# Patient Record
Sex: FEMALE | Race: WHITE | NOT HISPANIC OR LATINO | Employment: STUDENT | URBAN - METROPOLITAN AREA
[De-identification: names, ages, dates, MRNs, and addresses within clinical notes are randomized per-mention and may not be internally consistent; named-entity substitution may affect disease eponyms.]

---

## 2023-08-31 LAB
COMPLETE PATHOLOGY REPORT: NORMAL
CONVERTED CLINICAL DIAGNOSIS-HISTORY: NORMAL
CONVERTED FINAL DIAGNOSIS: NORMAL
CONVERTED FINAL REPORT PDF LINK TO COPY AND PASTE: NORMAL
CONVERTED GROSS DESCRIPTION: NORMAL
CONVERTED INTRAOPERATIVE DIAGNOSIS: NORMAL

## 2023-09-26 PROBLEM — C79.31 PRIMARY MALIGNANT NEOPLASM OF LUNG WITH METASTASIS TO BRAIN (MULTI): Status: ACTIVE | Noted: 2023-09-26

## 2023-09-26 PROBLEM — Z13.71 BRCA NEGATIVE: Status: ACTIVE | Noted: 2023-09-26

## 2023-09-26 PROBLEM — G93.89 BRAIN MASS: Status: ACTIVE | Noted: 2023-09-26

## 2023-09-26 PROBLEM — C34.90 PRIMARY MALIGNANT NEOPLASM OF LUNG WITH METASTASIS TO BRAIN (MULTI): Status: ACTIVE | Noted: 2023-09-26

## 2023-09-26 PROBLEM — C48.2: Status: ACTIVE | Noted: 2023-09-26

## 2023-09-26 PROBLEM — R92.8 ABNORMAL FINDING ON BREAST IMAGING: Status: ACTIVE | Noted: 2023-09-26

## 2023-09-26 RX ORDER — TRAMADOL HYDROCHLORIDE 100 MG/1
TABLET, COATED ORAL
COMMUNITY

## 2023-09-26 RX ORDER — POLYETHYLENE GLYCOL 3350 17 G/17G
17 POWDER, FOR SOLUTION ORAL DAILY
COMMUNITY
Start: 2020-07-26

## 2023-09-26 RX ORDER — DOXYCYCLINE HYCLATE 100 MG/1
TABLET, DELAYED RELEASE ORAL
COMMUNITY

## 2023-09-26 RX ORDER — ZOLPIDEM TARTRATE 10 MG/1
10 TABLET ORAL NIGHTLY
COMMUNITY

## 2023-09-26 RX ORDER — HYDROCODONE BITARTRATE AND HOMATROPINE METHYLBROMIDE ORAL SOLUTION 5; 1.5 MG/5ML; MG/5ML
LIQUID ORAL
COMMUNITY

## 2023-09-26 RX ORDER — ACETAMINOPHEN 500 MG
500 TABLET ORAL
COMMUNITY
Start: 2020-07-24

## 2023-09-26 RX ORDER — DOCUSATE SODIUM 100 MG/1
100 CAPSULE, LIQUID FILLED ORAL 2 TIMES DAILY
COMMUNITY
Start: 2020-07-24

## 2023-09-26 RX ORDER — MELOXICAM 15 MG/1
15 TABLET ORAL DAILY
COMMUNITY

## 2023-09-26 RX ORDER — DIPHENHYDRAMINE HCL 25 MG
25 CAPSULE ORAL AS NEEDED
COMMUNITY

## 2023-09-26 RX ORDER — OXYCODONE HYDROCHLORIDE 5 MG/1
5 TABLET ORAL EVERY 6 HOURS
COMMUNITY
Start: 2020-07-24

## 2023-09-26 RX ORDER — IBUPROFEN 600 MG/1
600 TABLET ORAL EVERY 6 HOURS PRN
COMMUNITY
Start: 2020-07-24

## 2023-09-26 RX ORDER — ENOXAPARIN SODIUM 100 MG/ML
40 INJECTION SUBCUTANEOUS DAILY
COMMUNITY
Start: 2020-07-26

## 2023-09-26 RX ORDER — ALPRAZOLAM 0.25 MG/1
TABLET ORAL
COMMUNITY

## 2023-09-26 RX ORDER — DULOXETIN HYDROCHLORIDE 60 MG/1
60 CAPSULE, DELAYED RELEASE ORAL DAILY
COMMUNITY

## 2023-09-26 RX ORDER — TOPIRAMATE 100 MG/1
100 TABLET, FILM COATED ORAL DAILY
COMMUNITY

## 2023-09-26 RX ORDER — FLUTICASONE PROPIONATE 50 MCG
1 SPRAY, SUSPENSION (ML) NASAL DAILY
COMMUNITY

## 2023-09-26 RX ORDER — BUPROPION HYDROCHLORIDE 300 MG/1
300 TABLET ORAL EVERY 24 HOURS
COMMUNITY

## 2023-09-26 RX ORDER — BENZONATATE 100 MG/1
100 CAPSULE ORAL AS NEEDED
COMMUNITY

## 2023-09-26 RX ORDER — DEXAMETHASONE 2 MG/1
TABLET ORAL
COMMUNITY

## 2023-09-26 RX ORDER — BUSPIRONE HYDROCHLORIDE 7.5 MG/1
TABLET ORAL
COMMUNITY

## 2023-09-26 RX ORDER — PANTOPRAZOLE SODIUM 20 MG/1
TABLET, DELAYED RELEASE ORAL
COMMUNITY

## 2023-09-26 RX ORDER — PROCHLORPERAZINE MALEATE 10 MG
TABLET ORAL
COMMUNITY

## 2023-09-28 ENCOUNTER — DOCUMENTATION (OUTPATIENT)
Dept: NEUROSURGERY | Facility: HOSPITAL | Age: 58
End: 2023-09-28
Payer: COMMERCIAL

## 2023-09-28 NOTE — PROGRESS NOTES
57 yo female diagnosed 2020 stage 2 (peritoneum) with brain metastasis and resection of cerebellar mass

## 2023-10-02 ENCOUNTER — HOSPITAL ENCOUNTER (OUTPATIENT)
Dept: RADIOLOGY | Facility: HOSPITAL | Age: 58
Discharge: HOME | End: 2023-10-02
Payer: COMMERCIAL

## 2023-10-02 ENCOUNTER — OFFICE VISIT (OUTPATIENT)
Dept: NEUROSURGERY | Facility: HOSPITAL | Age: 58
End: 2023-10-02
Payer: COMMERCIAL

## 2023-10-02 VITALS
OXYGEN SATURATION: 99 % | RESPIRATION RATE: 18 BRPM | BODY MASS INDEX: 28.4 KG/M2 | SYSTOLIC BLOOD PRESSURE: 112 MMHG | DIASTOLIC BLOOD PRESSURE: 80 MMHG | HEART RATE: 106 BPM | WEIGHT: 175.93 LBS | TEMPERATURE: 97.3 F

## 2023-10-02 DIAGNOSIS — C79.31 METASTASIS TO BRAIN (MULTI): ICD-10-CM

## 2023-10-02 DIAGNOSIS — Z09 POSTOPERATIVE FOLLOW-UP: ICD-10-CM

## 2023-10-02 PROCEDURE — 70450 CT HEAD/BRAIN W/O DYE: CPT

## 2023-10-02 PROCEDURE — 99024 POSTOP FOLLOW-UP VISIT: CPT | Performed by: NEUROLOGICAL SURGERY

## 2023-10-02 PROCEDURE — 70450 CT HEAD/BRAIN W/O DYE: CPT | Performed by: RADIOLOGY

## 2023-10-02 ASSESSMENT — PAIN SCALES - GENERAL: PAINLEVEL: 7

## 2023-10-02 NOTE — PROGRESS NOTES
59 yo POV. S/P craniotomy with mesh cranioplasty 8/29/2023. Alert and oriented x 3. Sutures removed 9/15/2023. Dr Sinclair consultation for surgical boost. Dr Padgett Oncologist at [facility] for lung cancer.    Anitra Garcia is a 58 y.o. year old female    HPI  59 yo is here POV. S/P craniotomy with mesh cranioplasty 8/29/2023. Alert and oriented x 3. Sutures removed 9/15/2023. Dr Sinclair consultation for surgical boost. Dr Padgett Oncologist at [facility] for lung cancer.  Patient is doing well post op, but she does have some residual headaches frontal.  Incision healing well. Patient denies fevers, headaches, nausea, vomiting, speech difficulty, seizures, double/blurry vision, sensory loss, weakness, incontinence, pain.         Review of Systems   Neurological:  Positive for headaches.   All other systems reviewed and are negative.         Past Medical History:   Diagnosis Date    Metastasis to brain (CMS/HCC)     Metastatic carcinoma (CMS/HCC)     S/P craniotomy        Past Surgical History:   Procedure Laterality Date    CRANIOTOMY Left 08/29/2023    OTHER SURGICAL HISTORY  06/10/2021    Nose surgery    OTHER SURGICAL HISTORY  06/10/2021    Ankle surgery    OTHER SURGICAL HISTORY  06/10/2021    Hysterectomy total    OTHER SURGICAL HISTORY  06/10/2021    Kidney surgery           Current Outpatient Medications:     acetaminophen (Tylenol) 500 mg tablet, Take 1 tablet (500 mg) by mouth every 4 hours., Disp: , Rfl:     atogepant (Qulipta) 60 mg tablet tablet, Take 1 tablet (60 mg) by mouth once daily., Disp: , Rfl:     buPROPion XL (Wellbutrin XL) 300 mg 24 hr tablet, Take 1 tablet (300 mg) by mouth once every 24 hours., Disp: , Rfl:     docusate sodium (Colace) 100 mg capsule, Take 1 capsule (100 mg) by mouth twice a day., Disp: , Rfl:     DULoxetine (Cymbalta) 60 mg DR capsule, Take 1 capsule (60 mg) by mouth once daily., Disp: , Rfl:     ibuprofen 600 mg tablet, Take 1 tablet (600 mg) by mouth every 6 hours if needed for  moderate pain (4 - 6)., Disp: , Rfl:     zolpidem (Ambien) 10 mg tablet, Take 1 tablet (10 mg) by mouth once daily at bedtime., Disp: , Rfl:     ALPRAZolam (Xanax) 0.25 mg tablet, , Disp: , Rfl:     benzonatate (Tessalon) 100 mg capsule, Take 1 capsule (100 mg) by mouth if needed., Disp: , Rfl:     busPIRone (Buspar) 7.5 mg tablet, busPIRone HCl TABS  Refills: 0     Active, Disp: , Rfl:     dexAMETHasone (Decadron) 2 mg tablet, , Disp: , Rfl:     diphenhydrAMINE (BenadryL) 25 mg capsule, Take 1 capsule (25 mg) by mouth if needed., Disp: , Rfl:     doxycycline (Doryx) 100 mg EC tablet, Do not crush or chew. Take with a full glass of water and do not lie down for at least 30 minutes after., Disp: , Rfl:     enoxaparin (Lovenox) 40 mg/0.4 mL syringe, Inject 0.4 mL (40 mg) under the skin once daily., Disp: , Rfl:     fluticasone (Flonase) 50 mcg/actuation nasal spray, Administer 1 spray into each nostril once daily., Disp: , Rfl:     hydrocodone-homatropine (Hycodan) 5-1.5 mg/5 mL syrup, , Disp: , Rfl:     meloxicam (Mobic) 15 mg tablet, Take 1 tablet (15 mg) by mouth once daily., Disp: , Rfl:     niraparib (Zejula) 100 mg capsule, Zejula 100 MG Oral Capsule  Refills: 0     Active, Disp: , Rfl:     oxyCODONE (Roxicodone) 5 mg immediate release tablet, Take 1 tablet (5 mg) by mouth every 6 hours., Disp: , Rfl:     pantoprazole (ProtoNix) 20 mg EC tablet, Do not crush, chew, or split., Disp: , Rfl:     polyethylene glycol (Miralax) 17 gram/dose powder, Take 17 g by mouth once daily. UNTIL BOWEL MOVEMENTS ARE REGULAR AND SOFT, THEN DAILY as NEEDED FOR CONSTIPATION, Disp: , Rfl:     prochlorperazine (Compazine) 10 mg tablet, Compazine 10 MG TABS  Refills: 0     Active, Disp: , Rfl:     topiramate (Topamax) 100 mg tablet, Take 1 tablet (100 mg) by mouth once daily., Disp: , Rfl:     traMADol (Ultram) 100 mg tablet, traMADol HCl TABS  Refills: 0     Active, Disp: , Rfl:         Objective   Vitals:    10/02/23 1121   BP:  112/80   Pulse: 106   Resp: 18   Temp: 36.3 °C (97.3 °F)   SpO2: 99%       Neurological Exam  Mental Status  Awake, alert and oriented to person, place and time. Recent and remote memory are intact. Speech is normal. Language is fluent with no aphasia. Attention and concentration are normal.  Skin: incision c/d/I, with some mild erythema. No swelling, no evidence of pseudomeningocele..    Cranial Nerves  CN II: Visual acuity is normal. Visual fields full to confrontation.  CN III, IV, VI: Extraocular movements intact bilaterally. Normal lids and orbits bilaterally. Pupils equal round and reactive to light bilaterally.  CN V: Facial sensation is normal.  CN VII: Full and symmetric facial movement.  CN VIII: Hearing is normal.  CN IX, X: Palate elevates symmetrically. Normal gag reflex.  CN XI: Shoulder shrug strength is normal.  CN XII: Tongue midline without atrophy or fasciculations.    Motor  Normal muscle bulk throughout. No pronator drift.    Sensory  Sensation is intact to light touch, pinprick, vibration and proprioception in all four extremities.    Reflexes  Deep tendon reflexes are 2+ and symmetric in all four extremities.    Coordination  Right: Finger-to-nose normal.Left: Finger-to-nose normal.    Gait  Normal casual, toe, heel and tandem gait.        Relevant Results    CT head wo IV contrast    Result Date: 10/2/2023  Interpreted By:  Demetri Vazquez and Tavana Shahrzad STUDY: CT HEAD WO IV CONTRAST;  10/2/2023 1:40 pm   INDICATION: Signs/Symptoms:patient post op craniotomy with headache.   COMPARISON: Head CT 08/29/2023, brain MRI 08/30/2023   ACCESSION NUMBER(S): [id number]   ORDERING CLINICIAN: KEELEY HINDS   TECHNIQUE: Noncontrast axial CT scan of the head was performed. Angled reformats in brain and bone windows were generated. The images were reviewed in bone, brain, blood and soft tissue windows.   FINDINGS: Postsurgical changes of suboccipital craniotomy and mesh cranioplasty for left  cerebellar mass resection. There has been interval decrease in the degree of pneumocephalus and hyperdense blood products at the surgical bed. The surrounding hypodensity adjacent to the resection site is most consistent with vasogenic edema, improved from previous.   The 3rd ventricle has mildly dilated to 8 mm (previously 6 mm) there is decreased effacement of the ambient cisterns compared to postoperative head CT as well as mass effect on the 4th ventricle. No evidence of crowding of the foramen magnum. No uncal herniation. No midline shift.   A hypodense lesion is again noted in the left cerebellar hemisphere superiorly, corresponding to a known metastatic lesion. An additional hypodense metastatic lesion is noted in the right cerebellar hemisphere. Multiple additional metastatic foci are noted again throughout bilateral cerebral hemispheres, corresponding to known metastatic lesions with surrounding vasogenic edema, better evaluated on postoperative MRI dated 08/30/2023.   Gray-white matter differentiation is otherwise intact. No evidence of large vascular territory infarct. No new intraparenchymal hemorrhage or extra-axial fluid collections identified.   There is expected postsurgical changes in the occipital scalp. Otherwise, paranasal sinuses and mastoid air cells are clear.         1. Expected postsurgical changes of suboccipital craniotomy and mesh cranioplasty for left cerebellar mass resection with slightly decreased pneumocephalus and blood products at the resection bed. 2. Slightly increased prominence of the supratentorial ventricles compared to prior examination, with slight decrease in the effacement of the ambient cisterns and the 4th ventricle.     I personally reviewed the images/study and I agree with the findings as stated. This study was interpreted at [facility], [place], [place].   MACRO: None   Signed by: Demetri Vazquez 10/2/2023 2:10 PM Dictation workstation:    [id number]    CT head wo IV contrast    Result Date: 8/30/2023  Interpreted By:  GLORIA PEDRO MD and IZABELLA BUSTILLOS MD MRN: [id number] Patient Name: MELONIE BLAND  STUDY: CT HEAD WO CONTRAST;  8/29/2023 10:18 pm  INDICATION: post op, Lie Flat: Yes .  COMPARISON: Same day CT head at 4:27 p.m.  ACCESSION NUMBER(S): [id number]  ORDERING CLINICIAN: KARYN BOWER  TECHNIQUE: Noncontrast axial CT scan of head was performed. Angled reformats in brain and bone windows were generated. The images were reviewed in bone, brain, blood and soft tissue windows.  FINDINGS: Interval postoperative changes status post sub occipital craniotomy for left cerebellar tumor resection with titanium mesh cranioplasty. Interval removal of previously seen metastatic lesion within the left cerebellum, with expected degree of pneumocephalus underlying the cranioplasty site, as well as scattered within the resection cavity. Linear hyperdensities along the surgical margins, favored to represent postsurgical blood products. Additionally, there is hypodensity surrounding the resection site, favored to represent vasogenic edema. Similar appearance of hypodensity within the anterior aspect of the left cerebellar hemisphere, consistent with metastatic lesion.  Redemonstration of multiple regions of hyperattenuation and hypoattenuation within the bilateral cerebral hemispheres, corresponding to known metastatic lesions with surrounding vasogenic edema seen on prior MRI brain.  Redemonstration of partial effacement of the 4th ventricle and crowding at the foramen magnum without tonsillar herniation or evidence of obstructive hydrocephalus, similar to prior exam. Ventricles, sulci, and basilar cisterns are otherwise unremarkable in appearance. No midline shift.  Postsurgical changes in the suboccipital region as described above, with overlying fat stranding and subcutaneous soft tissue gas within the scalp. The calvarium is otherwise unremarkable.   Paranasal sinuses and mastoids are clear.      1. Expected postsurgical changes status post suboccipital craniotomy for left cerebellar tumor resection with titanium mesh cranioplasty with trace blood products and pneumocephalus about the resection site. 2. Degree of 4th ventricular effacement and crowding of the foramen magnum without supratentorial obstructive hydrocephalus is also similar to prior MRI. 3. Additional stable findings as described above.  I personally reviewed the images/study and I agree with the findings as stated. This study was interpreted at [place], [place].  MACRO: None    Problem List Items Addressed This Visit    None  Visit Diagnoses       Postoperative follow-up        Metastasis to brain (CMS/HCC)        Relevant Orders    CT head wo IV contrast (Completed)               Assessment/Plan   Patient is doing well post op. Incision healing well. But patient is having some residual headaches, so CT head noncontrast obtained to day to evaluate for any issues. I personally reviewed the CT scan and fortunately, fit shows post operative changes, but no major hemorrhage, swelling, or concerns. No evidence of pseudomeningocele.    I discussed CNS Tumor board recommendations and plan for post op radiosurgical boost to resection cavity. She is following with Dr. Sinclair of rad onc.     Patient is doing well post operatively from GTR of cerebellar met    Incision healing well. Proper wound care discussed with the patient.    Med onc and Rad Onc followup    Neurosurgery followup in 3 months     Diagnoses and all orders for this visit:  Postoperative follow-up  Metastasis to brain (CMS/HCC)  -     CT head wo IV contrast; Future

## 2023-10-20 ASSESSMENT — VISUAL ACUITY: VA_NORMAL: 1

## 2023-10-20 ASSESSMENT — ENCOUNTER SYMPTOMS: HEADACHES: 1

## 2023-11-21 ENCOUNTER — TELEPHONE (OUTPATIENT)
Dept: RADIATION ONCOLOGY | Facility: HOSPITAL | Age: 58
End: 2023-11-21
Payer: COMMERCIAL

## 2023-11-21 NOTE — TELEPHONE ENCOUNTER
Called pt. to remind of appointment on 11/27/2023 at 3:30 pm with Dr. Sinclair. Pt's phone went to voicemail left number if needs to reschedule.

## 2023-11-27 ENCOUNTER — HOSPITAL ENCOUNTER (OUTPATIENT)
Dept: RADIOLOGY | Facility: HOSPITAL | Age: 58
Discharge: HOME | End: 2023-11-27
Payer: COMMERCIAL

## 2023-11-27 ENCOUNTER — HOSPITAL ENCOUNTER (OUTPATIENT)
Dept: RADIATION ONCOLOGY | Facility: HOSPITAL | Age: 58
Setting detail: RADIATION/ONCOLOGY SERIES
Discharge: HOME | End: 2023-11-27
Payer: COMMERCIAL

## 2023-11-27 VITALS
DIASTOLIC BLOOD PRESSURE: 74 MMHG | TEMPERATURE: 97.3 F | WEIGHT: 153.8 LBS | HEIGHT: 65 IN | HEART RATE: 115 BPM | OXYGEN SATURATION: 100 % | RESPIRATION RATE: 18 BRPM | BODY MASS INDEX: 25.62 KG/M2 | SYSTOLIC BLOOD PRESSURE: 101 MMHG

## 2023-11-27 DIAGNOSIS — C79.31 SECONDARY MALIGNANT NEOPLASM OF BRAIN (MULTI): ICD-10-CM

## 2023-11-27 DIAGNOSIS — G93.89 OTHER SPECIFIED DISORDERS OF BRAIN: ICD-10-CM

## 2023-11-27 DIAGNOSIS — C79.31 METASTASIS TO BRAIN (MULTI): Primary | ICD-10-CM

## 2023-11-27 PROCEDURE — 2550000001 HC RX 255 CONTRASTS: Performed by: NEUROLOGICAL SURGERY

## 2023-11-27 PROCEDURE — 70553 MRI BRAIN STEM W/O & W/DYE: CPT

## 2023-11-27 PROCEDURE — 70553 MRI BRAIN STEM W/O & W/DYE: CPT | Performed by: RADIOLOGY

## 2023-11-27 PROCEDURE — A9575 INJ GADOTERATE MEGLUMI 0.1ML: HCPCS | Performed by: NEUROLOGICAL SURGERY

## 2023-11-27 RX ORDER — GADOTERATE MEGLUMINE 376.9 MG/ML
16 INJECTION INTRAVENOUS
Status: COMPLETED | OUTPATIENT
Start: 2023-11-27 | End: 2023-11-27

## 2023-11-27 RX ADMIN — GADOTERATE MEGLUMINE 16 ML: 376.9 INJECTION INTRAVENOUS at 15:04

## 2023-11-27 ASSESSMENT — PATIENT HEALTH QUESTIONNAIRE - PHQ9
2. FEELING DOWN, DEPRESSED OR HOPELESS: SEVERAL DAYS
5. POOR APPETITE OR OVEREATING: NEARLY EVERY DAY
10. IF YOU CHECKED OFF ANY PROBLEMS, HOW DIFFICULT HAVE THESE PROBLEMS MADE IT FOR YOU TO DO YOUR WORK, TAKE CARE OF THINGS AT HOME, OR GET ALONG WITH OTHER PEOPLE: NOT DIFFICULT AT ALL
SUM OF ALL RESPONSES TO PHQ9 QUESTIONS 1 AND 2: 4
8. MOVING OR SPEAKING SO SLOWLY THAT OTHER PEOPLE COULD HAVE NOTICED. OR THE OPPOSITE, BEING SO FIGETY OR RESTLESS THAT YOU HAVE BEEN MOVING AROUND A LOT MORE THAN USUAL: NOT AT ALL
7. TROUBLE CONCENTRATING ON THINGS, SUCH AS READING THE NEWSPAPER OR WATCHING TELEVISION: NEARLY EVERY DAY
1. LITTLE INTEREST OR PLEASURE IN DOING THINGS: NEARLY EVERY DAY
9. THOUGHTS THAT YOU WOULD BE BETTER OFF DEAD, OR OF HURTING YOURSELF: NOT AT ALL
SUM OF ALL RESPONSES TO PHQ QUESTIONS 1-9: 17
4. FEELING TIRED OR HAVING LITTLE ENERGY: NEARLY EVERY DAY
3. TROUBLE FALLING OR STAYING ASLEEP OR SLEEPING TOO MUCH: NEARLY EVERY DAY
6. FEELING BAD ABOUT YOURSELF - OR THAT YOU ARE A FAILURE OR HAVE LET YOURSELF OR YOUR FAMILY DOWN: SEVERAL DAYS

## 2023-11-27 ASSESSMENT — ENCOUNTER SYMPTOMS
LOSS OF SENSATION IN FEET: 1
DEPRESSION: 0
OCCASIONAL FEELINGS OF UNSTEADINESS: 1

## 2023-11-27 NOTE — PROGRESS NOTES
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  On maintenance niraparib.  Extracranially controlled on 23 CT CAP.  Developed imbalance, slurred speech, R facial droop over 3 wks.  23 brain MRI at [facility] = Multiple metastases (3.6 cm L cerebellar, 1 cm R cerebellar, 4.4 cm L cerebellar vermis, 4.5 cm R occipital, 2 cm L corpus callosum, 4.4 cm L medial posterior frontal, 2.6 cm R frontal, 1.6 cm L frontal).  Symptoms improved on Dex 4q6 and Protonix.  Mild R facial droop on 23 exam, with L dysmetria. She was seen by Radiation Oncology as an inpatient on 23, and elected to undergo palliative radiation treatment of her intracranial disease. Repeat brain MRI on 23 revealed 14 metastases.     Most recent radiation therapy:  The patient received whole brain radiation therapy (23 Gy/5 fx) from 23-23 which she tolerated well.    Interval since radiation therapy: 3 months    Interval History: Since completing WBRT, she underwent operative resection of her largest left cerebellar metastasis by Neurosurgery on 23 (suboccipital craniectomy with titanium mesh cranioplasty) with final pathology c/w high-grade serous carcinoma.  She was most recently seen by Neurosurgery on 10/2/23 where she noted headaches but on CT had no major hemorrhage, swelling or evidence of pseudomeningocele following gross total resection. Today she reports weight loss of 10 lbs since 10/30/23 and leg weakness with daily falls.  She notes that her Medical Oncologist has ordered Physical Therapy, and that she  started chemotherapy in late October, having most recently seen Medical Oncology on 11/24/23.     Review of Systems: A 12-point review of systems was conducted and is as per interval history    Imaging Studies:   Brain MRI (11/27/23 compared with 8/30/23 MRI): Good response of intracranial disease, with no evidence of intracranial disease progression.  This study was read by myself, as the official Radiology read was unavailable at the time of this patient encounter.     Physical Examination:  Vital Signs: T = 36.3, BP = 101/74, P = 115, RR = 18, O2 sat = 100% on RA  General: WD/WN.  In mild distress.  In wheelchair.   Neurologic: AAO x 3. GCS 15.  Tongue midline.  Recall 3/3 immediately and 1/3 after 3 minutes (3/3 with prompting)  Eyes: Anicteric sclera.  EOMI  HENT: Normocephalic; atraumatic.  Hair loss apparent.   Cardiovascular: No cyanosis. Normal point of maximum impulse location.   Respiratory: Non-labored respirations.  Symmetrical chest wall expansion.  No audible wheezes.  Gastrointestinal: No abdominal pain or distention on inspection.  No splenomegaly on inspection.  Psychiatry: Appropriate mood and affect.  Alert and oriented.   Lymphatics: No palpable cervical or axillary lymph nodes apparent  Skin: No rashes or masses visible  Extremities: LEIVA x 4.  No c/c/e.    Assessment:  58yoF c metastatic ovarian/fallopian tube cancer s/p debulking in 2020.  On maintenance niraparib.  Extracranially controlled on 8/11/23 CT CAP.  Developed imbalance, slurred speech, R facial droop; 8/11/23 brain MRI at [facility] [place] = Multiple metastases (3.6 cm L cerebellar, 1 cm R cerebellar, 4.4 cm L cerebellar vermis, 4.5 cm R occipital, 2 cm L corpus callosum, 4.4 cm L medial posterior frontal, 2.6 cm R frontal, 1.6 cm L frontal).  Symptoms improved on Dex 4q6 and Protonix.  Repeat brain MRI on 8/18/23 revealed 14 metastases.  S/p whole brain radiation therapy (23 Gy/5 fx) from 8/22/23-8/28/23 followed by Neurosurgery  operative resection of her largest left cerebellar metastasis on 8/29/23.  11/27/23 brain MRI (c/w 8/30/23 MRI) = Good response of intracranial disease with no evidence of disease progression.     Plan:  We would like to see the patient in 3 months with a brain MRI without and with contrast.  The patient should continue to see Medical Oncology (12/29/23), Gynecologic Oncology, Neurosurgery and her PCP as scheduled.      The patient was in agreement with the plan, and her questions were answered to her satisfaction.  She knows to contact us at any time with any further questions or concerns.     Sachi Sinclair III, M.D.  Director of Spine Oncology   of Radiation Oncology and Neurological Surgery  [employer] [employer]

## 2023-11-29 ENCOUNTER — APPOINTMENT (OUTPATIENT)
Dept: RADIATION ONCOLOGY | Facility: HOSPITAL | Age: 58
End: 2023-11-29
Payer: COMMERCIAL

## 2024-02-26 ENCOUNTER — TELEPHONE (OUTPATIENT)
Dept: RADIATION ONCOLOGY | Facility: HOSPITAL | Age: 59
End: 2024-02-26
Payer: COMMERCIAL

## 2024-02-26 NOTE — TELEPHONE ENCOUNTER
Called pt to remind of appointment on 02/27/24 at 2:30. Pt's phone went to voicemail left number if needs to reschedule.

## 2024-02-27 ENCOUNTER — HOSPITAL ENCOUNTER (OUTPATIENT)
Dept: RADIOLOGY | Facility: HOSPITAL | Age: 59
Discharge: HOME | End: 2024-02-27
Payer: COMMERCIAL

## 2024-02-27 ENCOUNTER — HOSPITAL ENCOUNTER (OUTPATIENT)
Dept: RADIATION ONCOLOGY | Facility: HOSPITAL | Age: 59
Setting detail: RADIATION/ONCOLOGY SERIES
Discharge: HOME | End: 2024-02-27
Payer: COMMERCIAL

## 2024-02-27 VITALS
RESPIRATION RATE: 18 BRPM | OXYGEN SATURATION: 97 % | SYSTOLIC BLOOD PRESSURE: 113 MMHG | TEMPERATURE: 96.8 F | BODY MASS INDEX: 27.43 KG/M2 | HEART RATE: 110 BPM | WEIGHT: 169.97 LBS | DIASTOLIC BLOOD PRESSURE: 81 MMHG

## 2024-02-27 DIAGNOSIS — C79.31 METASTASIS TO BRAIN (MULTI): ICD-10-CM

## 2024-02-27 DIAGNOSIS — C79.31 METASTASIS TO BRAIN (MULTI): Primary | ICD-10-CM

## 2024-02-27 PROCEDURE — 70553 MRI BRAIN STEM W/O & W/DYE: CPT | Performed by: RADIOLOGY

## 2024-02-27 PROCEDURE — 99214 OFFICE O/P EST MOD 30 MIN: CPT

## 2024-02-27 PROCEDURE — 70553 MRI BRAIN STEM W/O & W/DYE: CPT

## 2024-02-27 PROCEDURE — 2550000001 HC RX 255 CONTRASTS: Performed by: NEUROLOGICAL SURGERY

## 2024-02-27 PROCEDURE — A9575 INJ GADOTERATE MEGLUMI 0.1ML: HCPCS | Performed by: NEUROLOGICAL SURGERY

## 2024-02-27 RX ORDER — GADOTERATE MEGLUMINE 376.9 MG/ML
15 INJECTION INTRAVENOUS
Status: COMPLETED | OUTPATIENT
Start: 2024-02-27 | End: 2024-02-27

## 2024-02-27 RX ADMIN — GADOTERATE MEGLUMINE 15 ML: 376.9 INJECTION INTRAVENOUS at 15:02

## 2024-02-27 ASSESSMENT — COLUMBIA-SUICIDE SEVERITY RATING SCALE - C-SSRS
2. HAVE YOU ACTUALLY HAD ANY THOUGHTS OF KILLING YOURSELF?: NO
1. IN THE PAST MONTH, HAVE YOU WISHED YOU WERE DEAD OR WISHED YOU COULD GO TO SLEEP AND NOT WAKE UP?: NO
6. HAVE YOU EVER DONE ANYTHING, STARTED TO DO ANYTHING, OR PREPARED TO DO ANYTHING TO END YOUR LIFE?: NO

## 2024-02-27 ASSESSMENT — ENCOUNTER SYMPTOMS
VOMITING: 0
HEMATURIA: 0
WHEEZING: 0
EYE PROBLEMS: 0
COUGH: 0
DIFFICULTY URINATING: 0
ARTHRALGIAS: 0
CHILLS: 0
FEVER: 0
DEPRESSION: 0
NERVOUS/ANXIOUS: 0
PALPITATIONS: 0
OCCASIONAL FEELINGS OF UNSTEADINESS: 0
ABDOMINAL DISTENTION: 0
NECK PAIN: 0
LOSS OF SENSATION IN FEET: 0
SHORTNESS OF BREATH: 0
CHEST TIGHTNESS: 0
ADENOPATHY: 0
DIARRHEA: 0
TROUBLE SWALLOWING: 0
ABDOMINAL PAIN: 0
FATIGUE: 1
DIAPHORESIS: 0
SEIZURES: 0
SPEECH DIFFICULTY: 1

## 2024-02-27 ASSESSMENT — PATIENT HEALTH QUESTIONNAIRE - PHQ9
3. TROUBLE FALLING OR STAYING ASLEEP OR SLEEPING TOO MUCH: NEARLY EVERY DAY
4. FEELING TIRED OR HAVING LITTLE ENERGY: NEARLY EVERY DAY
1. LITTLE INTEREST OR PLEASURE IN DOING THINGS: SEVERAL DAYS
2. FEELING DOWN, DEPRESSED OR HOPELESS: SEVERAL DAYS
9. THOUGHTS THAT YOU WOULD BE BETTER OFF DEAD, OR OF HURTING YOURSELF: NOT AT ALL
5. POOR APPETITE OR OVEREATING: NEARLY EVERY DAY
10. IF YOU CHECKED OFF ANY PROBLEMS, HOW DIFFICULT HAVE THESE PROBLEMS MADE IT FOR YOU TO DO YOUR WORK, TAKE CARE OF THINGS AT HOME, OR GET ALONG WITH OTHER PEOPLE: NOT DIFFICULT AT ALL
7. TROUBLE CONCENTRATING ON THINGS, SUCH AS READING THE NEWSPAPER OR WATCHING TELEVISION: NOT AT ALL
8. MOVING OR SPEAKING SO SLOWLY THAT OTHER PEOPLE COULD HAVE NOTICED. OR THE OPPOSITE, BEING SO FIGETY OR RESTLESS THAT YOU HAVE BEEN MOVING AROUND A LOT MORE THAN USUAL: SEVERAL DAYS
SUM OF ALL RESPONSES TO PHQ QUESTIONS 1-9: 14
SUM OF ALL RESPONSES TO PHQ9 QUESTIONS 1 AND 2: 2
6. FEELING BAD ABOUT YOURSELF - OR THAT YOU ARE A FAILURE OR HAVE LET YOURSELF OR YOUR FAMILY DOWN: MORE THAN HALF THE DAYS

## 2024-02-27 ASSESSMENT — PAIN SCALES - GENERAL: PAINLEVEL: 0-NO PAIN

## 2024-02-27 NOTE — PROGRESS NOTES
"Radiation Oncology Follow-Up    Patient Name:  Anitra Garcia  MRN:  [id number]  :  1965    Referring Provider: No ref. provider found  Primary Care Provider: Lesvia Shea MD  Care Team: Patient Care Team:  Lesvia Shea MD as PCP - General    Date of Service: 2024     Diagnosis:   Metastatic ovarian/fallopian tube cancer s/p debulking in      Most recent radiation therapy:  The patient received whole brain radiation therapy (23 Gy/5 fx) from 23-23 which she tolerated well.     SUBJECTIVE  History of Present Illness:   58yoF c metastatic ovarian/fallopian tube cancer s/p debulking in . On maintenance niraparib. Extracranially controlled on 23 CT CAP. Developed imbalance, slurred speech, R facial droop over 3 wks. 23 brain MRI at [facility] = Multiple metastases (3.6 cm L cerebellar, 1 cm R cerebellar, 4.4 cm L cerebellar vermis, 4.5 cm R occipital, 2 cm L corpus callosum, 4.4 cm L medial posterior frontal, 2.6 cm R frontal, 1.6 cm L frontal). Symptoms improved on Dex 4q6 and Protonix. Mild R facial droop on 23 exam, with L dysmetria. She was seen by Radiation Oncology as an inpatient on 23, and elected to undergo palliative radiation treatment of her intracranial disease.  The patient received whole brain radiation therapy (23 Gy/5 fx) from 23-23 which was tolerated well.  Today the patient states that she's doing \"okay\", but is quite fatigued secondary to her systemic treatments.  Since her WBRT, the patient endorses better balance, and improvement of her slurred speech.  Continues to have some issues with fine motor control of her hands which has remained stable.  Patient to resume PT for balance and deconditioning.  Endorses that her headaches have improved greatly.  Continues to struggle with PO intake.  She states that she isn't really hungry, but tries to bridge her calories with an occasional Ensure.  [place] [facility] [place]   " Denies chills, fever, SOB or chest pain.  Denies seizures, dizziness, diplopia, hearing changes, vision changes or focal sensory/motor changes.  Denies abdominal pain, vomiting or diarrhea.  Does endorses nausea during her systemic treatments.  We discussed the importance of taking her anti emetic medications as a scheduled medication to help, consistently, manage the nausea.  Does endorse some constipation and states that stool softeners don't really help.  We discussed trailing Reginaldoalax and she was agreeable.  An MRI of the brain completed today looked relatively stable and told the patient that I'd reach back out to her once the final read is posted.      Treatment Rendered:   Radiation Treatments       No radiation treatments to show. (Treatments may have been administered in another system.)            Review of Systems:   Review of Systems   Constitutional:  Positive for fatigue and unexpected weight change. Negative for chills, diaphoresis and fever.   HENT:   Negative for hearing loss, lump/mass, tinnitus and trouble swallowing.    Eyes:  Negative for eye problems.   Respiratory:  Negative for chest tightness, cough, shortness of breath and wheezing.    Cardiovascular:  Negative for chest pain and palpitations.   Gastrointestinal:  Positive for constipation and nausea (With her systemic treatments.). Negative for abdominal distention, abdominal pain, diarrhea and vomiting.   Genitourinary:  Negative for difficulty urinating and hematuria.    Musculoskeletal:  Positive for back pain (Lumbar back pain.), gait problem and myalgias. Negative for arthralgias and neck pain.   Neurological:  Positive for extremity weakness (Generalized weakness.), gait problem, headaches, light-headedness (with strenuous activity.) and speech difficulty. Negative for dizziness and seizures.   Hematological:  Negative for adenopathy.   Psychiatric/Behavioral:  Negative for confusion and depression. The patient is not nervous/anxious.       The patient's current pain level was assessed.  They report currently having a pain of 0 out of 10.  They feel their pain is under control with the use of pain medications.    Performance Status:   The Karnofsky performance scale today is 70, Cares for self; unable to carry on normal activity or to do active work (ECOG equivalent 1).        OBJECTIVE  Vital Signs:  There were no vitals taken for this visit.   Physical Exam:  Physical Exam  Constitutional:       General: She is not in acute distress.     Appearance: Normal appearance. She is normal weight. She is ill-appearing. She is not toxic-appearing or diaphoretic.   HENT:      Head: Normocephalic and atraumatic.      Comments: Thinning hair.      Nose: Nose normal. No congestion or rhinorrhea.      Mouth/Throat:      Mouth: Mucous membranes are moist.   Eyes:      General: Lids are normal. Gaze aligned appropriately.      Extraocular Movements: Extraocular movements intact.      Pupils: Pupils are equal, round, and reactive to light.   Neck:      Thyroid: No thyroid mass or thyroid tenderness.   Cardiovascular:      Rate and Rhythm: Normal rate and regular rhythm.      Pulses: Normal pulses.      Heart sounds: Normal heart sounds. No murmur heard.  Pulmonary:      Effort: Pulmonary effort is normal. No tachypnea or respiratory distress.      Breath sounds: Normal breath sounds. No wheezing or rhonchi.   Abdominal:      General: Abdomen is flat. Bowel sounds are normal. There is no distension.      Palpations: Abdomen is soft. There is no mass.      Tenderness: There is no abdominal tenderness. There is no guarding or rebound.   Musculoskeletal:         General: No swelling, tenderness, deformity or signs of injury. Normal range of motion.      Cervical back: Full passive range of motion without pain, normal range of motion and neck supple. No rigidity or tenderness. No pain with movement. Normal range of motion.      Right lower leg: No edema.      Left  lower leg: No edema.   Lymphadenopathy:      Head:      Right side of head: No submental, submandibular, tonsillar, preauricular, posterior auricular or occipital adenopathy.      Left side of head: No submental, submandibular, tonsillar, preauricular, posterior auricular or occipital adenopathy.      Cervical:      Right cervical: No superficial, deep or posterior cervical adenopathy.     Left cervical: No superficial, deep or posterior cervical adenopathy.   Skin:     General: Skin is warm and dry.      Capillary Refill: Capillary refill takes less than 2 seconds.      Coloration: Skin is pale. Skin is not jaundiced.      Findings: No erythema, lesion or rash.   Neurological:      General: No focal deficit present.      Mental Status: She is alert and oriented to person, place, and time.      Cranial Nerves: No cranial nerve deficit.      Sensory: No sensory deficit.      Motor: Weakness present. No seizure activity or pronator drift.      Coordination: Romberg sign negative. Coordination abnormal. Finger-Nose-Finger Test and Heel to Shin Test normal.      Gait: Gait abnormal.      Comments: Some mild slurred speech.    Psychiatric:         Attention and Perception: Attention normal.         Mood and Affect: Mood normal.         Behavior: Behavior normal. Behavior is cooperative.   MR BRAIN W AND WO IV CONTRAST; 2/27/2024 3:27 pm      INDICATION:  Signs/Symptoms:F/u treated brain metastases.      COMPARISON:  Multiple prior MRIs of the brain, most recently 11/27/2023.      ACCESSION NUMBER(S):  [id number]      ORDERING CLINICIAN:  ERICKA GOLDEN      TECHNIQUE:  Axial T2, FLAIR, DWI, gradient echo T2 and T1 weighted images of the  brain were acquired. Postcontrast T1 weighted images were acquired  after administration of 15 mL Dotarem gadolinium based intravenous  contrast.      FINDINGS:  Images are somewhat degraded by motion.      Postoperative changes are consistent with previous  suboccipital  craniectomy with surgical mesh overlying the craniectomy site.      There is again a fluid collection superficial to and surrounding the  surgical mesh overlying the suboccipital craniectomy site measuring  approximately 11 mm in thickness (postcontrast sagittal series 15,  image 51). Deep to the surgical mesh overlying the suboccipital  craniectomy site there is unchanged extra-axial likely epidural fluid  collection measuring approximately 9 mm in thickness (postcontrast  sagittal series 15, image 52). This may very well communicate with  the extracranial collection.      There is similar smooth dural thickening and enhancement deep to the  craniectomy an overlying the cerebellum which may be postoperative in  etiology.      The resection cavity deep to the craniotomy site along the posterior  left cerebellar hemisphere is similar to previous given differences  in technique. Again seen is residual increased diffusion signal  within an along the margins of the resection cavity which may be  related to evolving blood products and/or hypercellularity. There is  again susceptibility artifact on gradient echo T2 weighted imaging  along the periphery of the surgical resection cavity and left  cerebellar folia which may be related to blood products or  mineralization. There is again irregular enhancement along the  periphery of the surgical resection cavity, relatively similar from  the previous examination. There is surrounding nonenhancing signal  abnormality on FLAIR and T2 weighted imaging which may be due at  least in part to edema.      There is similar appearance of an adjacent dominant cystic and solid  rim enhancing lesion along the superior left cerebellar hemisphere  which again measures up to 15 mm in greatest axial dimension  (post-contrast series 14, image 167). Additional lesions involving  the lateral right cerebellar hemisphere, right occipital lobe, left  corona radiata, left anterior body  of the corpus callosum, right  frontal convexity, left frontal lobe convexity are stable to less  pronounced in size in comparison to previous given differences in  technique. Susceptibility blooming scattered throughout several of  the lesions are similar to previous and may represent blood products  and/or mineralization. There is a punctate, nodular focus of  enhancement centered slightly to the left of midline along the  cerebellar vermis on image 153 of 240 that may be new or more  conspicuous compared with the previous exam.      A linear tract of gliosis along a previous shunt tract within the  left parieto-occipital region is redemonstrated. The ventricles as  well as periventricular white matter changes surrounding the lateral  ventricles are similar in size and appearance to previous given  differences in technique. The basal cisterns are patent. There is no  midline shift.      Mucosal thickening is scattered throughout the visualized paranasal  sinuses. The bilateral mastoid air cells are opacified.      IMPRESSION:  Status post suboccipital craniectomy with surgical mesh. There is  similar appearance of the resection cavity as well as extra-axial  fluid collections superficial to and surrounding the craniectomy.  Several of the previously noted lesions are stable to less pronounced  in comparison to previous MRI dated 11/27/2023, as described above.  There is a punctate new or more conspicuous lesion centered slightly  to the left of midline in the cerebellar vermis, however. Recommend  continued attention on follow-up.      OBJECTIVE:   Vital Signs:  /81 (BP Location: Left arm, Patient Position: Sitting, BP Cuff Size: Small adult)   Pulse 110   Temp 36 °C (96.8 °F) (Skin)   Resp 18   Wt 77.1 kg (169 lb 15.6 oz)   SpO2 97%   BMI 27.43 kg/m²    Pain Scale: The patient's current pain level was assessed.  They report currently having a pain of 0 out of 10.              Assessment / Plan:    "58yoF c metastatic ovarian/fallopian tube cancer s/p debulking in 2020. On maintenance niraparib. Extracranially controlled on 8/11/23 CT CAP. Developed imbalance, slurred speech, R facial droop over 3 wks. 8/11/23 brain MRI at [facility] = Multiple metastases (3.6 cm L cerebellar, 1 cm R cerebellar, 4.4 cm L cerebellar vermis, 4.5 cm R occipital, 2 cm L corpus callosum, 4.4 cm L medial posterior frontal, 2.6 cm R frontal, 1.6 cm L frontal). Symptoms improved on Dex 4q6 and Protonix. Mild R facial droop on 8/14/23 exam, with L dysmetria. She was seen by Radiation Oncology as an inpatient on 8/14/23, and elected to undergo palliative radiation treatment of her intracranial disease.  The patient received whole brain radiation therapy (23 Gy/5 fx) from 8/22/23-8/28/23 which was tolerated well.  An MRI of the brain completed today looked relatively stable and told the patient that I'd reach back out to her once the final read is posted.      Addendum:  I had Dr. Coker review the MRI brain from 2/27/24 and he feltthat the \"new or more conspicuous lesion centered slightly to the left of midline in the cerebellar vermis\" is most likely part of one of the inferior lesions that had previously been treated.  He suggested to continue Q3 month imaging to better characterize    .     PLAN:      --FU with Karson Moreno CNP in 3 months.   --MRI brain in 3 months.   --Continue to follow with Dr. Reese for Medical Oncology management.     Please reach out with any questions or concerns.     NCCN Guidelines were applicable to guide this patients treatment plan.  KEILA Negro-CNP     "

## 2024-02-28 ASSESSMENT — ENCOUNTER SYMPTOMS
UNEXPECTED WEIGHT CHANGE: 1
MYALGIAS: 1
EXTREMITY WEAKNESS: 1
LIGHT-HEADEDNESS: 1
HEADACHES: 1
DIZZINESS: 0
CONFUSION: 0
BACK PAIN: 1
NAUSEA: 1
CONSTIPATION: 1

## 2024-05-28 ENCOUNTER — APPOINTMENT (OUTPATIENT)
Dept: RADIATION ONCOLOGY | Facility: HOSPITAL | Age: 59
End: 2024-05-28
Payer: COMMERCIAL

## 2024-05-28 ENCOUNTER — HOSPITAL ENCOUNTER (OUTPATIENT)
Dept: RADIOLOGY | Facility: HOSPITAL | Age: 59
Discharge: HOME | End: 2024-05-28
Payer: COMMERCIAL

## 2024-05-28 DIAGNOSIS — C79.31 METASTASIS TO BRAIN (MULTI): ICD-10-CM

## 2024-05-28 PROCEDURE — A9575 INJ GADOTERATE MEGLUMI 0.1ML: HCPCS | Mod: SE

## 2024-05-28 PROCEDURE — 70553 MRI BRAIN STEM W/O & W/DYE: CPT | Performed by: RADIOLOGY

## 2024-05-28 PROCEDURE — 2550000001 HC RX 255 CONTRASTS: Mod: SE

## 2024-05-28 PROCEDURE — 70553 MRI BRAIN STEM W/O & W/DYE: CPT

## 2024-05-28 RX ORDER — GADOTERATE MEGLUMINE 376.9 MG/ML
19 INJECTION INTRAVENOUS
Status: COMPLETED | OUTPATIENT
Start: 2024-05-28 | End: 2024-05-28

## 2024-05-28 RX ADMIN — GADOTERATE MEGLUMINE 19 ML: 376.9 INJECTION INTRAVENOUS at 10:47

## 2024-06-06 ENCOUNTER — TELEPHONE (OUTPATIENT)
Dept: RADIATION ONCOLOGY | Facility: HOSPITAL | Age: 59
End: 2024-06-06
Payer: COMMERCIAL

## 2024-06-06 NOTE — TELEPHONE ENCOUNTER
Called pt to remind of appointment on 06/10/24 at 11:30. Pt's phone went to voicemail left number if needs to reschedule.

## 2024-06-10 ENCOUNTER — HOSPITAL ENCOUNTER (OUTPATIENT)
Dept: RADIATION ONCOLOGY | Facility: HOSPITAL | Age: 59
Setting detail: RADIATION/ONCOLOGY SERIES
Discharge: HOME | End: 2024-06-10
Payer: COMMERCIAL

## 2024-06-10 VITALS
BODY MASS INDEX: 22.5 KG/M2 | DIASTOLIC BLOOD PRESSURE: 72 MMHG | RESPIRATION RATE: 18 BRPM | SYSTOLIC BLOOD PRESSURE: 103 MMHG | HEART RATE: 107 BPM | TEMPERATURE: 96.6 F | OXYGEN SATURATION: 98 % | WEIGHT: 139.4 LBS

## 2024-06-10 DIAGNOSIS — C79.31 METASTASIS TO BRAIN (MULTI): ICD-10-CM

## 2024-06-10 PROCEDURE — 99214 OFFICE O/P EST MOD 30 MIN: CPT

## 2024-06-10 RX ORDER — DEXAMETHASONE 2 MG/1
2 TABLET ORAL DAILY
Qty: 30 TABLET | Refills: 0 | Status: SHIPPED | OUTPATIENT
Start: 2024-06-10 | End: 2024-07-10

## 2024-06-10 ASSESSMENT — ENCOUNTER SYMPTOMS
FLANK PAIN: 0
BACK PAIN: 1
ADENOPATHY: 0
DIZZINESS: 0
DIAPHORESIS: 0
EXTREMITY WEAKNESS: 1
OCCASIONAL FEELINGS OF UNSTEADINESS: 0
EYE PROBLEMS: 0
NERVOUS/ANXIOUS: 0
CONSTIPATION: 1
LOSS OF SENSATION IN FEET: 1
HEADACHES: 1
SLEEP DISTURBANCE: 1
CHILLS: 0
ABDOMINAL PAIN: 0
COUGH: 0
SPEECH DIFFICULTY: 1
ABDOMINAL DISTENTION: 0
ARTHRALGIAS: 0
MYALGIAS: 1
DIFFICULTY URINATING: 0
NAUSEA: 0
NECK PAIN: 1
VOMITING: 0
TROUBLE SWALLOWING: 0
LIGHT-HEADEDNESS: 1
WHEEZING: 0
PALPITATIONS: 0
SEIZURES: 0
CONFUSION: 0
FEVER: 0
UNEXPECTED WEIGHT CHANGE: 1
DIARRHEA: 0
HEMATURIA: 0
FATIGUE: 1
CHEST TIGHTNESS: 0
DEPRESSION: 1
SHORTNESS OF BREATH: 0

## 2024-06-10 ASSESSMENT — PATIENT HEALTH QUESTIONNAIRE - PHQ9
10. IF YOU CHECKED OFF ANY PROBLEMS, HOW DIFFICULT HAVE THESE PROBLEMS MADE IT FOR YOU TO DO YOUR WORK, TAKE CARE OF THINGS AT HOME, OR GET ALONG WITH OTHER PEOPLE: NOT DIFFICULT AT ALL
2. FEELING DOWN, DEPRESSED OR HOPELESS: NEARLY EVERY DAY
5. POOR APPETITE OR OVEREATING: NOT AT ALL
7. TROUBLE CONCENTRATING ON THINGS, SUCH AS READING THE NEWSPAPER OR WATCHING TELEVISION: SEVERAL DAYS
6. FEELING BAD ABOUT YOURSELF - OR THAT YOU ARE A FAILURE OR HAVE LET YOURSELF OR YOUR FAMILY DOWN: SEVERAL DAYS
8. MOVING OR SPEAKING SO SLOWLY THAT OTHER PEOPLE COULD HAVE NOTICED. OR THE OPPOSITE, BEING SO FIGETY OR RESTLESS THAT YOU HAVE BEEN MOVING AROUND A LOT MORE THAN USUAL: NOT AT ALL
1. LITTLE INTEREST OR PLEASURE IN DOING THINGS: SEVERAL DAYS
3. TROUBLE FALLING OR STAYING ASLEEP OR SLEEPING TOO MUCH: MORE THAN HALF THE DAYS
4. FEELING TIRED OR HAVING LITTLE ENERGY: MORE THAN HALF THE DAYS
9. THOUGHTS THAT YOU WOULD BE BETTER OFF DEAD, OR OF HURTING YOURSELF: NOT AT ALL
SUM OF ALL RESPONSES TO PHQ QUESTIONS 1-9: 10
SUM OF ALL RESPONSES TO PHQ9 QUESTIONS 1 AND 2: 4

## 2024-06-10 ASSESSMENT — PAIN SCALES - GENERAL: PAINLEVEL: 4

## 2024-06-10 NOTE — PROGRESS NOTES
"Radiation Oncology Follow-Up    Patient Name:  Anitra Garcia  MRN:  [id number]  :  1965    Referring Provider: Efren Moreno, *  Primary Care Provider: Xiomara Harrell MD  Care Team: Patient Care Team:  Xiomara Harrell MD as PCP - General (Internal Medicine)    Date of Service: 6/10/2024     Diagnosis:   Metastatic ovarian/fallopian tube cancer s/p debulking in      Most recent radiation therapy:  The patient received whole brain radiation therapy (23 Gy/5 fx) from 23-23 which she tolerated well.     SUBJECTIVE  History of Present Illness:   58yoF c metastatic ovarian/fallopian tube cancer s/p debulking in . On maintenance niraparib. Extracranially controlled on 23 CT CAP. Developed imbalance, slurred speech, R facial droop over 3 wks. 23 brain MRI at [facility] = Multiple metastases (3.6 cm L cerebellar, 1 cm R cerebellar, 4.4 cm L cerebellar vermis, 4.5 cm R occipital, 2 cm L corpus callosum, 4.4 cm L medial posterior frontal, 2.6 cm R frontal, 1.6 cm L frontal). Symptoms improved on Dex 4q6 and Protonix. Mild R facial droop on 23 exam, with L dysmetria. She was seen by Radiation Oncology as an inpatient on 23, and elected to undergo palliative radiation treatment of her intracranial disease.  The patient received whole brain radiation therapy (23 Gy/5 fx) from 23-23 which was tolerated well.  Today the patient states that she's doing \"okay\", but is quite fatigued secondary to her systemic treatments.  Since her WBRT, the patient endorses better balance, and improvement of her slurred speech.  Continues to have some issues with fine motor control of her hands which has remained stable.  Patient to resume PT for balance and deconditioning.  Endorses that her headaches have improved greatly.  Continues to struggle with PO intake.  She states that she isn't really hungry, but tries to bridge her calories with an occasional Ensure.  She follows with " a dietitian at [facility] [place] [facility].   Denies chills, fever, SOB or chest pain.  Denies seizures, dizziness, diplopia, hearing changes, vision changes or focal sensory/motor changes.  Denies abdominal pain, vomiting or diarrhea.  Does endorses nausea during her systemic treatments.  We discussed the importance of taking her anti emetic medications as a scheduled medication to help, consistently, manage the nausea.  Does endorse some constipation and states that stool softeners don't really help.  We discussed trailing Miralax and she was agreeable.  An MRI of the brain completed today looked relatively stable and told the patient that I'd reach back out to her once the final read is posted.      6/10/24 Interval Visit:   Today the patient is in clinic, accompanied by her sister, for a routine Radiation Oncology FU visit.  Today the patient states that she's doing somewhat better with her mobility/strength s/p PT.  Continues to have some imbalance/generalized weakness when standing which has been stable.  Continues to use a franco when ambulating distances.  Endorses a left occipital headache that radiates to her left neck and shoulder which as been stable.  She rates the pain as a 5/10 and has been managing with Tylenol.   Denies seizures, dizziness, vision changes, or new focal sensory/motor changes.  Continue to have slurred speech which has been stable.  Denies chills, fever, bony pain, SOB, or chest pain.  Denies abdominal pain, pelvic pain, nausea, vomiting or diarrhea.  Has intermittent constipation and manges with a stool softener.  Today we re-reviewed her MRI brain completed on 5/28/24.  Per the Radiology Read, there is again evidence of small satellite lesions along the superior  left cerebellar hemisphere denoted by arrows on the axial post gadolinium volumetric T1 weighted MRI images slice 150 of 256 and 147 of 256 as well as a small linear focus of enhancement along the superior cerebellar vermis denoted by an arrow on  axial post  gadolinium volumetric T1 slice 145 of 256 which appear minimally more conspicuous when compared with the prior study dated 02/27/2024. The lesions demonstrate a component of bright signal on the precontrast T1 images which appears slightly more conspicuous when compared with 02/27/2024 raising the possibility of dystrophic calcification/mineralization and/or subacute blood products with a component of suspected minimal superimposed enhancement on the post gadolinium images. There is blooming dark signal on the gradient echo T2 images associated with these lesions suggesting associated blood products/hemosiderin deposition anterior dystrophic calcification/mineralization. The lesions are too small to be further characterized on the obtained advanced MRI imaging. Continued attention to these nonspecific foci on short term follow-up MRI imaging would be recommended.  We discussed the concern regarding the aforementioned satellite lesions and the recommendation of short interval (6-8 weeks) and patient was agreeable to the imaging.     Note: The 5/28/24 MRI brain was discussed with Ravindra Ribeiro MD and he suggested short interval imaging.     Treatment Rendered:   Radiation Treatments       No radiation treatments to show. (Treatments may have been administered in another system.)            Review of Systems:   Review of Systems   Constitutional:  Positive for fatigue and unexpected weight change. Negative for chills, diaphoresis and fever.   HENT:   Negative for hearing loss, lump/mass, tinnitus and trouble swallowing.    Eyes:  Negative for eye problems.   Respiratory:  Negative for chest tightness, cough, shortness of breath and wheezing.    Cardiovascular:  Negative for chest pain and palpitations.   Gastrointestinal:  Positive for constipation. Negative for abdominal distention, abdominal pain, diarrhea, nausea and vomiting.   Genitourinary:  Negative for difficulty urinating and hematuria.     Musculoskeletal:  Positive for back pain (upper right back/shoulder.), gait problem (Left occipital), myalgias and neck pain. Negative for arthralgias and flank pain.   Neurological:  Positive for extremity weakness (Generalized weakness.), gait problem (Left occipital), headaches, light-headedness (with strenuous activity.) and speech difficulty. Negative for dizziness and seizures.   Hematological:  Negative for adenopathy.   Psychiatric/Behavioral:  Positive for depression (Being treated at [facility] and sleep disturbance. Negative for confusion. The patient is not nervous/anxious.      The patient's current pain level was assessed.  They report currently having a pain of 5 out of 10.  They feel their pain is not under control with the use of pain medications.    Performance Status:   The Karnofsky performance scale today is 70, Cares for self; unable to carry on normal activity or to do active work (ECOG equivalent 1).      OBJECTIVE  Vital Signs:  There were no vitals taken for this visit.   Physical Exam:  Physical Exam  Constitutional:       General: She is not in acute distress.     Appearance: Normal appearance. She is normal weight. She is ill-appearing. She is not toxic-appearing or diaphoretic.   HENT:      Head: Normocephalic and atraumatic.      Comments: Thinning hair.      Nose: Nose normal. No congestion or rhinorrhea.      Mouth/Throat:      Mouth: Mucous membranes are moist.   Eyes:      General: Lids are normal. Gaze aligned appropriately.      Extraocular Movements: Extraocular movements intact.      Pupils: Pupils are equal, round, and reactive to light.   Neck:      Thyroid: No thyroid mass or thyroid tenderness.   Cardiovascular:      Rate and Rhythm: Normal rate and regular rhythm.      Pulses: Normal pulses.      Heart sounds: Normal heart sounds. No murmur heard.  Pulmonary:      Effort: Pulmonary effort is normal. No tachypnea or respiratory distress.      Breath sounds: Normal breath  sounds. No wheezing or rhonchi.   Abdominal:      General: Abdomen is flat. Bowel sounds are normal. There is no distension.      Palpations: Abdomen is soft. There is no mass.      Tenderness: There is no abdominal tenderness. There is no guarding or rebound.   Musculoskeletal:         General: No swelling, tenderness, deformity or signs of injury. Normal range of motion.      Cervical back: Full passive range of motion without pain, normal range of motion and neck supple. No rigidity or tenderness. No pain with movement. Normal range of motion.      Right lower leg: No edema.      Left lower leg: No edema.   Lymphadenopathy:      Head:      Right side of head: No submental, submandibular, tonsillar, preauricular, posterior auricular or occipital adenopathy.      Left side of head: No submental, submandibular, tonsillar, preauricular, posterior auricular or occipital adenopathy.      Cervical:      Right cervical: No superficial, deep or posterior cervical adenopathy.     Left cervical: No superficial, deep or posterior cervical adenopathy.   Skin:     General: Skin is warm and dry.      Capillary Refill: Capillary refill takes less than 2 seconds.      Coloration: Skin is pale. Skin is not jaundiced.      Findings: No erythema, lesion or rash.   Neurological:      General: No focal deficit present.      Mental Status: She is alert and oriented to person, place, and time.      Cranial Nerves: No cranial nerve deficit.      Sensory: No sensory deficit.      Motor: Weakness present. No seizure activity or pronator drift.      Coordination: Romberg sign negative. Coordination abnormal. Finger-Nose-Finger Test and Heel to Shin Test normal.      Gait: Gait abnormal.      Comments: Some mild slurred speech.    Psychiatric:         Attention and Perception: Attention normal.         Mood and Affect: Mood normal.         Behavior: Behavior normal. Behavior is cooperative.     MR BRAIN W AND WO IV CONTRAST; 2/27/2024 3:27  pm  IMPRESSION:  Status post suboccipital craniectomy with surgical mesh. There is  similar appearance of the resection cavity as well as extra-axial  fluid collections superficial to and surrounding the craniectomy.  Several of the previously noted lesions are stable to less pronounced  in comparison to previous MRI dated 11/27/2023, as described above.  There is a punctate new or more conspicuous lesion centered slightly  to the left of midline in the cerebellar vermis, however. Recommend  continued attention on follow-up.    MR BRAIN TUMOR PERFUSION PROTOCOL W AND WO IV CONTRAST;  5/28/2024  11:05 am   IMPRESSION:  Postoperative changes are again identified compatible with a previous  suboccipital craniectomy with surgical mesh overlying the craniectomy  site.      There is again evidence of a surgical resection cavity deep to the  craniotomy site along the posterior left cerebellar hemisphere. A  small amount of bright signal on the precontrast T1 images again  noted within/along the margins of the surgical resection cavity  suggesting a small amount of dystrophic calcification/mineralization.  The gradient echo T2 images again demonstrate blooming dark signal  surrounding the margins of the surgical resection cavity compatible  with hemosiderin deposition anterior dystrophic  calcification/mineralization. The post gadolinium images again  demonstrate mild superimposed enhancement along the margins of the  surgical resection cavity similar in appearance when compared with  the prior examination dated 02/27/2024. The enhancement along the  margins of the surgical resection cavity is too small to be further  characterized on the obtained advanced MRI imaging. There is again  evidence of surrounding nonenhancing bright signal on the FLAIR and  T2 images within the left cerebellar hemisphere minimally more  pronounced when compared with 02/27/2024 which while nonspecific  raises the possibility of mild interval  increased surrounding edema  and/or gliosis.      There is again evidence of small satellite lesions along the superior  left cerebellar hemisphere denoted by arrows on the axial post  gadolinium volumetric T1 weighted MRI images slice 150 of 256 and 147  of 256 as well as a small linear focus of enhancement along the  superior cerebellar vermis denoted by an arrow on axial post  gadolinium volumetric T1 slice 145 of 256 which appear minimally more  conspicuous when compared with the prior study dated 02/27/2024. The  lesions demonstrate a component of bright signal on the precontrast  T1 images which appears slightly more conspicuous when compared with  02/27/2024 raising the possibility of dystrophic  calcification/mineralization and/or subacute blood products with a  component of suspected minimal superimposed enhancement on the post  gadolinium images. There is blooming dark signal on the gradient echo  T2 images associated with these lesions suggesting associated blood  products/hemosiderin deposition anterior dystrophic  calcification/mineralization. The lesions are too small to be further  characterized on the obtained advanced MRI imaging. Continued  attention to these nonspecific foci on short term follow-up MRI  imaging would be recommended.      There is again evidence of a mixed signal hemorrhagic and/or  partially calcified lesion centered within the left corona radiata  demonstrating mildly more conspicuous rim enhancement on the current  study when compared with 02/27/2024. The rim enhancing lesion  measures approximately 12 mm x 10 mm in transaxial dimensions as seen  on axial post gadolinium volumetric T1 slice 89 of 256 by 17 mm in  craniocaudal dimension as seen on coronal post gadolinium volumetric  T1 slice 94 of 200. The lesion again demonstrates a component of  blooming dark signal on the gradient echo T2 images suggesting  associated blood products/hemosiderin deposition anterior  dystrophic  calcification/mineralization. The blooming dark signal on the  gradient echo T2 images renders further evaluation on the obtained  advanced MRI perfusion data suspect. That being said, no significant  elevated corrected rCBV values are noted in the region of the this  rim enhancing lesion.      There is again evidence of an approximately 7 mm lesion noted along  the right frontal lobe convexity as seen on axial post gadolinium  volumetric T1 slice 63 of 256 similar when compared with the prior  study dated 02/27/2024. There is again evidence of a small 5 mm  lesion noted along the left frontal lobe convexity similar in  appearance when compared with the prior study dated 02/27/2024. There  is again evidence of blooming dark signal on the gradient echo T2  images associated with the lesions suggesting associated blood  products/hemosiderin deposition anterior dystrophic  calcification/mineralization.      There is again evidence of a punctate enhancing lesion along the  lateral right cerebellar hemisphere as seen on axial post gadolinium  T1 slice 173 of 256 which appears similar when compared with the  prior study dated 02/27/2024.      There is a punctate enhancing lesion noted along the lateral right  cerebellar hemisphere as seen on axial post gadolinium volumetric T1  slice 179 of 256 which was not clearly visualized on the prior study.  There is a punctate enhancing lesion noted along the lateral right  frontal lobe as seen on axial post gadolinium volumetric T1 slice 96  of 256 which was not well defined on the on the prior study dated  02/27/2024. Continued attention to these punctate lesions on  follow-up imaging is recommended.      The above findings are superimposed upon moderate brain parenchymal  volume loss.      There is again evidence of scattered as well as more ill-defined  patchy and confluent white matter changes within the cerebral  hemispheres bilaterally with the more  "confluence/ill-defined white  matter changes noted to be more pronounced when compared with the  prior study dated 02/27/2024 and while nonspecific may be post  therapy related with the possibility of sequelae of more remote  small-vessel ischemic changes not excluded.       OBJECTIVE:   Vital Signs:        10/2/2023    11:21 AM 11/27/2023     2:46 PM 11/27/2023     3:45 PM 2/27/2024     1:59 PM 2/27/2024     4:04 PM 5/28/2024     9:54 AM 6/10/2024    11:27 AM   Vitals   Systolic 112  101  113  103   Diastolic 80  74  81  72   Heart Rate 106  115  110  107   Temp 36.3 °C (97.3 °F)  36.3 °C (97.3 °F)  36 °C (96.8 °F)  35.9 °C (96.6 °F)   Resp 18  18  18  18   Height (in)  1.676 m (5' 5.98\") 1.651 m (5' 5\") 1.676 m (5' 6\")  1.676 m (5' 6\")    Weight (lb) 175.93 175.93 153.8 170 169.97 140 139.4   BMI 29.24 kg/m2 28.41 kg/m2 25.59 kg/m2 27.44 kg/m2 27.43 kg/m2 22.6 kg/m2 22.5 kg/m2   BSA (m2) 1.91 m2 1.93 m2 1.79 m2 1.89 m2 1.89 m2 1.72 m2 1.72 m2   Visit Report Report                 Assessment / Plan:   58yoF c metastatic ovarian/fallopian tube cancer s/p debulking in 2020. On maintenance niraparib. Extracranially controlled on 8/11/23 CT CAP. Developed imbalance, slurred speech, R facial droop over 3 wks. 8/11/23 brain MRI at [facility] = Multiple metastases (3.6 cm L cerebellar, 1 cm R cerebellar, 4.4 cm L cerebellar vermis, 4.5 cm R occipital, 2 cm L corpus callosum, 4.4 cm L medial posterior frontal, 2.6 cm R frontal, 1.6 cm L frontal). Symptoms improved on Dex 4q6 and Protonix. Mild R facial droop on 8/14/23 exam, with L dysmetria. She was seen by Radiation Oncology as an inpatient on 8/14/23, and elected to undergo palliative radiation treatment of her intracranial disease.  The patient received whole brain radiation therapy (23 Gy/5 fx) from 8/22/23-8/28/23 which was tolerated well.  An MRI of the brain completed today looked relatively stable and told the patient that I'd reach back out to her once the " final read is posted.      PLAN:      --Short interval MRI with perfusion in 6 weeks.   --FU with Karson Moreno CNP in 6 weeks.   --Begin 2 mg of dexamethasone daily for occipital headaches.  5/28/24 imaging shows some cerebral edema.   --Continue to follow with Dr. Reese for Medical Oncology management.     Please reach out with any questions or concerns.     [facility]CCN Guidelines were applicable to guide this patients treatment plan.  Efren Moreno, KEILA-CNP

## 2024-07-15 ENCOUNTER — TELEPHONE (OUTPATIENT)
Dept: RADIATION ONCOLOGY | Facility: HOSPITAL | Age: 59
End: 2024-07-15
Payer: COMMERCIAL

## 2024-07-15 ASSESSMENT — ENCOUNTER SYMPTOMS
DIFFICULTY URINATING: 0
HEMATURIA: 0
EXTREMITY WEAKNESS: 1
CHILLS: 0
DIAPHORESIS: 0
CHEST TIGHTNESS: 0
FLANK PAIN: 0
DIARRHEA: 0
ABDOMINAL DISTENTION: 0
NERVOUS/ANXIOUS: 0
DEPRESSION: 1
FEVER: 0
FATIGUE: 1
NECK PAIN: 1
CONFUSION: 0
LIGHT-HEADEDNESS: 1
DIZZINESS: 0
COUGH: 0
TROUBLE SWALLOWING: 0
MYALGIAS: 1
SHORTNESS OF BREATH: 0
SPEECH DIFFICULTY: 1
VOMITING: 0
ABDOMINAL PAIN: 0
SLEEP DISTURBANCE: 1
SEIZURES: 0
ARTHRALGIAS: 0
WHEEZING: 0
ADENOPATHY: 0
PALPITATIONS: 0
NAUSEA: 0
CONSTIPATION: 1

## 2024-07-15 NOTE — TELEPHONE ENCOUNTER
Called pt to remind of appointment on 07/16/24 at 9:30. Pt's phone went to voicemail left number if needs to reschedule.

## 2024-07-15 NOTE — PROGRESS NOTES
"Radiation Oncology Follow-Up    Patient Name:  Anitra Garcia  MRN:  [id number]  :  1965    Referring Provider: Efren Moreno, *  Primary Care Provider: Xiomara Harrell MD  Care Team: Patient Care Team:  Xiomara Harrell MD as PCP - General (Internal Medicine)  Xiomara Harrell MD as PCP - [facility] PCP    Date of Service: 2024     Diagnosis:   Metastatic ovarian/fallopian tube cancer s/p debulking in      Most recent radiation therapy:  The patient received whole brain radiation therapy (23 Gy/5 fx) from 23-23 which she tolerated well.     SUBJECTIVE  History of Present Illness:   58yoF c metastatic ovarian/fallopian tube cancer s/p debulking in . On maintenance niraparib. Extracranially controlled on 23 CT CAP. Developed imbalance, slurred speech, R facial droop over 3 wks. 23 brain MRI at [facility] = Multiple metastases (3.6 cm L cerebellar, 1 cm R cerebellar, 4.4 cm L cerebellar vermis, 4.5 cm R occipital, 2 cm L corpus callosum, 4.4 cm L medial posterior frontal, 2.6 cm R frontal, 1.6 cm L frontal). Symptoms improved on Dex 4q6 and Protonix. Mild R facial droop on 23 exam, with L dysmetria. She was seen by Radiation Oncology as an inpatient on 23, and elected to undergo palliative radiation treatment of her intracranial disease.  The patient received whole brain radiation therapy (23 Gy/5 fx) from 23-23 which was tolerated well.  Today the patient states that she's doing \"okay\", but is quite fatigued secondary to her systemic treatments.  Since her WBRT, the patient endorses better balance, and improvement of her slurred speech.  Continues to have some issues with fine motor control of her hands which has remained stable.  Patient to resume PT for balance and deconditioning.  Endorses that her headaches have improved greatly.  Continues to struggle with PO intake.  She states that she isn't really hungry, but tries to bridge her calories " with an occasional Ensure.  She follows with a dietitian at [facility] [place].   Denies chills, fever, SOB or chest pain.  Denies seizures, dizziness, diplopia, hearing changes, vision changes or focal sensory/motor changes.  Denies abdominal pain, vomiting or diarrhea.  Does endorses nausea during her systemic treatments.  We discussed the importance of taking her anti emetic medications as a scheduled medication to help, consistently, manage the nausea.  Does endorse some constipation and states that stool softeners don't really help.  We discussed trailing Miralax and she was agreeable.  An MRI of the brain completed today looked relatively stable and told the patient that I'd reach back out to her once the final read is posted.      6/10/24 Interval Visit:   Today the patient is in clinic, accompanied by her sister, for a routine Radiation Oncology FU visit.  Today the patient states that she's doing somewhat better with her mobility/strength s/p PT.  Continues to have some imbalance/generalized weakness when standing which has been stable.  Continues to use a franco when ambulating distances.  Endorses a left occipital headache that radiates to her left neck and shoulder which as been stable.  She rates the pain as a 5/10 and has been managing with Tylenol.   Denies seizures, dizziness, vision changes, or new focal sensory/motor changes.  Continue to have slurred speech which has been stable.  Denies chills, fever, bony pain, SOB, or chest pain.  Denies abdominal pain, pelvic pain, nausea, vomiting or diarrhea.  Has intermittent constipation and manges with a stool softener.  Today we re-reviewed her MRI brain completed on 5/28/24.  Per the Radiology Read, there is again evidence of small satellite lesions along the superior  left cerebellar hemisphere denoted by arrows on the axial post gadolinium volumetric T1 weighted MRI images slice 150 of 256 and 147 of 256 as well as a small linear focus of enhancement along the  superior cerebellar vermis denoted by an arrow on axial post  gadolinium volumetric T1 slice 145 of 256 which appear minimally more conspicuous when compared with the prior study dated 02/27/2024. The lesions demonstrate a component of bright signal on the precontrast T1 images which appears slightly more conspicuous when compared with 02/27/2024 raising the possibility of dystrophic calcification/mineralization and/or subacute blood products with a component of suspected minimal superimposed enhancement on the post gadolinium images. There is blooming dark signal on the gradient echo T2 images associated with these lesions suggesting associated blood products/hemosiderin deposition anterior dystrophic calcification/mineralization. The lesions are too small to be further characterized on the obtained advanced MRI imaging. Continued attention to these nonspecific foci on short term follow-up MRI imaging would be recommended.  We discussed the concern regarding the aforementioned satellite lesions and the recommendation of short interval (6-8 weeks) and patient was agreeable to the imaging.     Note: The 5/28/24 MRI brain was discussed with Ravindra Ribeiro MD and he suggested short interval imaging.     7/16/24 Interval Visit:   Today the patient is in clinic, accompanied by her daughter, for a routine Radiation Oncology FU visit and review of an MRI of the brain.  The patient states that she's doing well.   Her generalized weakness had improved a great deal with PT.  She had been walking her driveway to build strength and is now walking the neighborhood.  Her speech is also improved as well.  Denies seizures, dizziness, vision changes, or new focal sensory/motor changes.  Denies chills, fever, bony pain, SOB, or chest pain.  Denies abdominal pain, pelvic pain, nausea, vomiting or diarrhea.  Has intermittent constipation and manges with a stool softener.   We briefly discussed her MRI brain and the difficulty of  interpretation give that she's had WBRT.   I suggested take her imaging to the 7/17/24 CNS Tumor Board for review.  The patient and daughter were agreeable with the plan.   I told them that I'd call the with recommendations late morning on 7/17/24.     7/17/24 Addendum:    The patients 7/16/24 MRI brain was taken to the 7/17/24 CNS Tumor Board for review.  The consensus was that the imaging is stable as compared to prior imaging.  The recommendation is to repeat an MRI brain with perfusion in 3 months.  This was discussed with the patient and daughter via phone call on 7/17/24.     Treatment Rendered:   Radiation Treatments       No radiation treatments to show. (Treatments may have been administered in another system.)            Review of Systems:   Review of Systems   Constitutional:  Positive for fatigue. Negative for chills, diaphoresis, fever and unexpected weight change.   HENT:   Negative for hearing loss, lump/mass, tinnitus and trouble swallowing.    Eyes:  Positive for eye problems (age related.).   Respiratory:  Negative for chest tightness, cough, shortness of breath and wheezing.    Cardiovascular:  Negative for chest pain and palpitations.   Gastrointestinal:  Positive for constipation. Negative for abdominal distention, abdominal pain, diarrhea, nausea and vomiting.   Genitourinary:  Negative for difficulty urinating and hematuria.    Musculoskeletal:  Positive for back pain (upper right back/shoulder.), gait problem (Left occipital), myalgias and neck pain. Negative for arthralgias and flank pain.   Neurological:  Positive for extremity weakness (Generalized weakness.), gait problem (Left occipital), light-headedness (with strenuous activity.) and speech difficulty. Negative for dizziness, headaches and seizures.   Hematological:  Negative for adenopathy.   Psychiatric/Behavioral:  Positive for depression (Being treated at [facility] [place].) and sleep disturbance. Negative for confusion. The patient is not  nervous/anxious.      The patient's current pain level was assessed.  They report currently having a pain of 5 out of 10.  They feel their pain is not under control with the use of pain medications.    Performance Status:   The Karnofsky performance scale today is 70, Cares for self; unable to carry on normal activity or to do active work (ECOG equivalent 1).      OBJECTIVE  Vital Signs:  There were no vitals taken for this visit.   Physical Exam:  Physical Exam  Constitutional:       General: She is not in acute distress.     Appearance: Normal appearance. She is normal weight. She is not ill-appearing, toxic-appearing or diaphoretic.   HENT:      Head: Normocephalic and atraumatic.      Comments: Thinning hair.      Nose: Nose normal. No congestion or rhinorrhea.      Mouth/Throat:      Mouth: Mucous membranes are moist.   Eyes:      General: Lids are normal. Gaze aligned appropriately.      Extraocular Movements: Extraocular movements intact.      Pupils: Pupils are equal, round, and reactive to light.   Neck:      Thyroid: No thyroid mass or thyroid tenderness.   Cardiovascular:      Rate and Rhythm: Normal rate and regular rhythm.      Pulses: Normal pulses.      Heart sounds: Normal heart sounds. No murmur heard.  Pulmonary:      Effort: Pulmonary effort is normal. No tachypnea or respiratory distress.      Breath sounds: Normal breath sounds. No wheezing or rhonchi.   Abdominal:      General: Abdomen is flat. Bowel sounds are normal. There is no distension.      Palpations: Abdomen is soft. There is no mass.      Tenderness: There is no abdominal tenderness. There is no guarding or rebound.   Musculoskeletal:         General: No swelling, tenderness, deformity or signs of injury. Normal range of motion.      Cervical back: Full passive range of motion without pain, normal range of motion and neck supple. No rigidity or tenderness. No pain with movement. Normal range of motion.      Right lower leg: No edema.       Left lower leg: No edema.   Lymphadenopathy:      Head:      Right side of head: No submental, submandibular, tonsillar, preauricular, posterior auricular or occipital adenopathy.      Left side of head: No submental, submandibular, tonsillar, preauricular, posterior auricular or occipital adenopathy.      Cervical:      Right cervical: No superficial, deep or posterior cervical adenopathy.     Left cervical: No superficial, deep or posterior cervical adenopathy.   Skin:     General: Skin is warm and dry.      Capillary Refill: Capillary refill takes less than 2 seconds.      Coloration: Skin is pale. Skin is not jaundiced.      Findings: No erythema, lesion or rash.   Neurological:      General: No focal deficit present.      Mental Status: She is alert and oriented to person, place, and time.      Cranial Nerves: No cranial nerve deficit.      Sensory: No sensory deficit.      Motor: Weakness present. No seizure activity or pronator drift.      Coordination: Romberg sign negative. Coordination abnormal. Finger-Nose-Finger Test and Heel to Shin Test normal.      Gait: Gait abnormal.      Comments: Some mild slurred speech.    Psychiatric:         Attention and Perception: Attention normal.         Mood and Affect: Mood normal.         Behavior: Behavior normal. Behavior is cooperative.      MR BRAIN W AND WO IV CONTRAST; 2/27/2024 3:27 pm  IMPRESSION:  Status post suboccipital craniectomy with surgical mesh. There is  similar appearance of the resection cavity as well as extra-axial  fluid collections superficial to and surrounding the craniectomy.  Several of the previously noted lesions are stable to less pronounced  in comparison to previous MRI dated 11/27/2023, as described above.  There is a punctate new or more conspicuous lesion centered slightly  to the left of midline in the cerebellar vermis, however. Recommend  continued attention on follow-up.    MR BRAIN TUMOR PERFUSION PROTOCOL W AND WO IV  CONTRAST;  5/28/2024  11:05 am   IMPRESSION:  Postoperative changes are again identified compatible with a previous  suboccipital craniectomy with surgical mesh overlying the craniectomy  site.      There is again evidence of a surgical resection cavity deep to the  craniotomy site along the posterior left cerebellar hemisphere. A  small amount of bright signal on the precontrast T1 images again  noted within/along the margins of the surgical resection cavity  suggesting a small amount of dystrophic calcification/mineralization.  The gradient echo T2 images again demonstrate blooming dark signal  surrounding the margins of the surgical resection cavity compatible  with hemosiderin deposition anterior dystrophic  calcification/mineralization. The post gadolinium images again  demonstrate mild superimposed enhancement along the margins of the  surgical resection cavity similar in appearance when compared with  the prior examination dated 02/27/2024. The enhancement along the  margins of the surgical resection cavity is too small to be further  characterized on the obtained advanced MRI imaging. There is again  evidence of surrounding nonenhancing bright signal on the FLAIR and  T2 images within the left cerebellar hemisphere minimally more  pronounced when compared with 02/27/2024 which while nonspecific  raises the possibility of mild interval increased surrounding edema  and/or gliosis.      There is again evidence of small satellite lesions along the superior  left cerebellar hemisphere denoted by arrows on the axial post  gadolinium volumetric T1 weighted MRI images slice 150 of 256 and 147  of 256 as well as a small linear focus of enhancement along the  superior cerebellar vermis denoted by an arrow on axial post  gadolinium volumetric T1 slice 145 of 256 which appear minimally more  conspicuous when compared with the prior study dated 02/27/2024. The  lesions demonstrate a component of bright signal on the  precontrast  T1 images which appears slightly more conspicuous when compared with  02/27/2024 raising the possibility of dystrophic  calcification/mineralization and/or subacute blood products with a  component of suspected minimal superimposed enhancement on the post  gadolinium images. There is blooming dark signal on the gradient echo  T2 images associated with these lesions suggesting associated blood  products/hemosiderin deposition anterior dystrophic  calcification/mineralization. The lesions are too small to be further  characterized on the obtained advanced MRI imaging. Continued  attention to these nonspecific foci on short term follow-up MRI  imaging would be recommended.      There is again evidence of a mixed signal hemorrhagic and/or  partially calcified lesion centered within the left corona radiata  demonstrating mildly more conspicuous rim enhancement on the current  study when compared with 02/27/2024. The rim enhancing lesion  measures approximately 12 mm x 10 mm in transaxial dimensions as seen  on axial post gadolinium volumetric T1 slice 89 of 256 by 17 mm in  craniocaudal dimension as seen on coronal post gadolinium volumetric  T1 slice 94 of 200. The lesion again demonstrates a component of  blooming dark signal on the gradient echo T2 images suggesting  associated blood products/hemosiderin deposition anterior dystrophic  calcification/mineralization. The blooming dark signal on the  gradient echo T2 images renders further evaluation on the obtained  advanced MRI perfusion data suspect. That being said, no significant  elevated corrected rCBV values are noted in the region of the this  rim enhancing lesion.      There is again evidence of an approximately 7 mm lesion noted along  the right frontal lobe convexity as seen on axial post gadolinium  volumetric T1 slice 63 of 256 similar when compared with the prior  study dated 02/27/2024. There is again evidence of a small 5 mm  lesion noted  along the left frontal lobe convexity similar in  appearance when compared with the prior study dated 02/27/2024. There  is again evidence of blooming dark signal on the gradient echo T2  images associated with the lesions suggesting associated blood  products/hemosiderin deposition anterior dystrophic  calcification/mineralization.      There is again evidence of a punctate enhancing lesion along the  lateral right cerebellar hemisphere as seen on axial post gadolinium  T1 slice 173 of 256 which appears similar when compared with the  prior study dated 02/27/2024.      There is a punctate enhancing lesion noted along the lateral right  cerebellar hemisphere as seen on axial post gadolinium volumetric T1  slice 179 of 256 which was not clearly visualized on the prior study.  There is a punctate enhancing lesion noted along the lateral right  frontal lobe as seen on axial post gadolinium volumetric T1 slice 96  of 256 which was not well defined on the on the prior study dated  02/27/2024. Continued attention to these punctate lesions on  follow-up imaging is recommended.      The above findings are superimposed upon moderate brain parenchymal  volume loss.      There is again evidence of scattered as well as more ill-defined  patchy and confluent white matter changes within the cerebral  hemispheres bilaterally with the more confluence/ill-defined white  matter changes noted to be more pronounced when compared with the  prior study dated 02/27/2024 and while nonspecific may be post  therapy related with the possibility of sequelae of more remote  small-vessel ischemic changes not excluded.    MR BRAIN TUMOR PERFUSION PROTOCOL W AND WO IV CONTRAST;  7/16/2024  9:47 am  IMPRESSION:  Stable MR appearance of the cerebral and cerebellar lesions and  postoperative/posttreatment changes. Perfusion/permeability imaging  is not useful in this case secondary to insufficient measurable  enhancing lesions and adjacent  "susceptibility artifact.       OBJECTIVE:   Vital Signs:        10/2/2023    11:21 AM 11/27/2023     2:46 PM 11/27/2023     3:45 PM 2/27/2024     1:59 PM 2/27/2024     4:04 PM 5/28/2024     9:54 AM 6/10/2024    11:27 AM   Vitals   Systolic 112  101  113  103   Diastolic 80  74  81  72   Heart Rate 106  115  110  107   Temp 36.3 °C (97.3 °F)  36.3 °C (97.3 °F)  36 °C (96.8 °F)  35.9 °C (96.6 °F)   Resp 18  18  18  18   Height (in)  1.676 m (5' 5.98\") 1.651 m (5' 5\") 1.676 m (5' 6\")  1.676 m (5' 6\")    Weight (lb) 175.93 175.93 153.8 170 169.97 140 139.4   BMI 29.24 kg/m2 28.41 kg/m2 25.59 kg/m2 27.44 kg/m2 27.43 kg/m2 22.6 kg/m2 22.5 kg/m2   BSA (m2) 1.91 m2 1.93 m2 1.79 m2 1.89 m2 1.89 m2 1.72 m2 1.72 m2   Visit Report Report                 Assessment / Plan:   58yoF c metastatic ovarian/fallopian tube cancer s/p debulking in 2020. On maintenance niraparib. Extracranially controlled on 8/11/23 CT CAP. Developed imbalance, slurred speech, R facial droop over 3 wks. 8/11/23 brain MRI at [facility] = Multiple metastases (3.6 cm L cerebellar, 1 cm R cerebellar, 4.4 cm L cerebellar vermis, 4.5 cm R occipital, 2 cm L corpus callosum, 4.4 cm L medial posterior frontal, 2.6 cm R frontal, 1.6 cm L frontal). Symptoms improved on Dex 4q6 and Protonix. Mild R facial droop on 8/14/23 exam, with L dysmetria. She was seen by Radiation Oncology as an inpatient on 8/14/23, and elected to undergo palliative radiation treatment of her intracranial disease.  The patient received whole brain radiation therapy (23 Gy/5 fx) from 8/22/23-8/28/23 which was tolerated well.  Today the patient has a short interval MRI brain to better characterize changes seen on 5/28/24 imaging.  All questions/concerns were addressed to the satisfaction of the patient.     7/17/24 Addendum:    The patients 7/16/24 MRI brain was taken to the 7/17/24 CNS Tumor Board for review.  The consensus was that the imaging is stable as compared to prior imaging.  " The recommendation is to repeat an MRI brain with perfusion in 3 months.  This was discussed with the patient and daughter via phone call on 7/17/24.     PLAN:      --MRI brain in 3 months.  --FU with Karson Moreno CNP in 6 weeks.  --Reorder for dexamethasone 2 mg daily placed with the patient pharmacy.    --Continue to follow with Dr. Reese for Medical Oncology management.     Please reach out with any questions or concerns.     NCCN Guidelines were applicable to guide this patients treatment plan.  KEILA Negro-CNP

## 2024-07-16 ENCOUNTER — HOSPITAL ENCOUNTER (OUTPATIENT)
Dept: RADIATION ONCOLOGY | Facility: HOSPITAL | Age: 59
Setting detail: RADIATION/ONCOLOGY SERIES
Discharge: HOME | End: 2024-07-16
Payer: COMMERCIAL

## 2024-07-16 ENCOUNTER — HOSPITAL ENCOUNTER (OUTPATIENT)
Dept: RADIOLOGY | Facility: HOSPITAL | Age: 59
Discharge: HOME | End: 2024-07-16
Payer: COMMERCIAL

## 2024-07-16 VITALS
OXYGEN SATURATION: 96 % | SYSTOLIC BLOOD PRESSURE: 117 MMHG | WEIGHT: 153.2 LBS | RESPIRATION RATE: 18 BRPM | DIASTOLIC BLOOD PRESSURE: 81 MMHG | HEART RATE: 83 BPM | HEIGHT: 66 IN | BODY MASS INDEX: 24.62 KG/M2 | TEMPERATURE: 97.2 F

## 2024-07-16 DIAGNOSIS — K29.60 REFLUX GASTRITIS: ICD-10-CM

## 2024-07-16 DIAGNOSIS — C79.31 METASTASIS TO BRAIN (MULTI): ICD-10-CM

## 2024-07-16 DIAGNOSIS — C79.31 PRIMARY MALIGNANT NEOPLASM OF LUNG WITH METASTASIS TO BRAIN (MULTI): ICD-10-CM

## 2024-07-16 DIAGNOSIS — C79.31 METASTASIS TO BRAIN (MULTI): Primary | ICD-10-CM

## 2024-07-16 DIAGNOSIS — C34.90 PRIMARY MALIGNANT NEOPLASM OF LUNG WITH METASTASIS TO BRAIN (MULTI): ICD-10-CM

## 2024-07-16 PROCEDURE — A9575 INJ GADOTERATE MEGLUMI 0.1ML: HCPCS | Mod: SE

## 2024-07-16 PROCEDURE — 70553 MRI BRAIN STEM W/O & W/DYE: CPT

## 2024-07-16 PROCEDURE — 2550000001 HC RX 255 CONTRASTS: Mod: SE

## 2024-07-16 PROCEDURE — 99214 OFFICE O/P EST MOD 30 MIN: CPT

## 2024-07-16 PROCEDURE — 70553 MRI BRAIN STEM W/O & W/DYE: CPT | Performed by: RADIOLOGY

## 2024-07-16 RX ORDER — GADOTERATE MEGLUMINE 376.9 MG/ML
20 INJECTION INTRAVENOUS
Status: COMPLETED | OUTPATIENT
Start: 2024-07-16 | End: 2024-07-16

## 2024-07-16 RX ORDER — DEXAMETHASONE 2 MG/1
2 TABLET ORAL DAILY
Qty: 30 TABLET | Refills: 1 | Status: SHIPPED | OUTPATIENT
Start: 2024-07-16 | End: 2024-09-14

## 2024-07-16 RX ORDER — PANTOPRAZOLE SODIUM 20 MG/1
20 TABLET, DELAYED RELEASE ORAL
Qty: 30 TABLET | Refills: 2 | Status: SHIPPED | OUTPATIENT
Start: 2024-07-16 | End: 2024-10-14

## 2024-07-16 ASSESSMENT — PATIENT HEALTH QUESTIONNAIRE - PHQ9
4. FEELING TIRED OR HAVING LITTLE ENERGY: NOT AT ALL
SUM OF ALL RESPONSES TO PHQ9 QUESTIONS 1 AND 2: 0
9. THOUGHTS THAT YOU WOULD BE BETTER OFF DEAD, OR OF HURTING YOURSELF: NOT AT ALL
1. LITTLE INTEREST OR PLEASURE IN DOING THINGS: NOT AT ALL
SUM OF ALL RESPONSES TO PHQ QUESTIONS 1-9: 0
3. TROUBLE FALLING OR STAYING ASLEEP OR SLEEPING TOO MUCH: NOT AT ALL
2. FEELING DOWN, DEPRESSED OR HOPELESS: NOT AT ALL
5. POOR APPETITE OR OVEREATING: NOT AT ALL
8. MOVING OR SPEAKING SO SLOWLY THAT OTHER PEOPLE COULD HAVE NOTICED. OR THE OPPOSITE, BEING SO FIGETY OR RESTLESS THAT YOU HAVE BEEN MOVING AROUND A LOT MORE THAN USUAL: NOT AT ALL
6. FEELING BAD ABOUT YOURSELF - OR THAT YOU ARE A FAILURE OR HAVE LET YOURSELF OR YOUR FAMILY DOWN: NOT AT ALL
7. TROUBLE CONCENTRATING ON THINGS, SUCH AS READING THE NEWSPAPER OR WATCHING TELEVISION: NOT AT ALL

## 2024-07-16 ASSESSMENT — PAIN SCALES - GENERAL: PAINLEVEL: 0-NO PAIN

## 2024-07-16 ASSESSMENT — ENCOUNTER SYMPTOMS
UNEXPECTED WEIGHT CHANGE: 0
OCCASIONAL FEELINGS OF UNSTEADINESS: 0
EYE PROBLEMS: 1
DEPRESSION: 0
HEADACHES: 0
BACK PAIN: 1
LOSS OF SENSATION IN FEET: 0

## 2024-07-16 ASSESSMENT — COLUMBIA-SUICIDE SEVERITY RATING SCALE - C-SSRS
6. HAVE YOU EVER DONE ANYTHING, STARTED TO DO ANYTHING, OR PREPARED TO DO ANYTHING TO END YOUR LIFE?: NO
2. HAVE YOU ACTUALLY HAD ANY THOUGHTS OF KILLING YOURSELF?: NO
1. IN THE PAST MONTH, HAVE YOU WISHED YOU WERE DEAD OR WISHED YOU COULD GO TO SLEEP AND NOT WAKE UP?: NO

## 2024-07-17 ENCOUNTER — TUMOR BOARD CONFERENCE (OUTPATIENT)
Dept: HEMATOLOGY/ONCOLOGY | Facility: HOSPITAL | Age: 59
End: 2024-07-17
Payer: COMMERCIAL

## 2024-07-17 NOTE — TUMOR BOARD NOTE
CNS Tumor Board Recommendations       Patient was presented by Efren Moreno CNP at our CNS Tumor Board on 07/17/24 which included representatives from Radiation oncology, Surgical oncology, Neuro-oncology, Pathology, Radiology, Research, Neurosurgery, Social Work (Neurosurgery).     Current patient presents with history of the following treatment history: PMH peritoneal carcinoma s/p debulking (2020), anxiety, depression, presented with facial droop and dizziness in 09/2023 found to have multiple metastases brain mass. Now s/p SOC for tumor resection 08/29/24. 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 Treated with WBRT. MRI brain 07/16/24 with stable appearance of cerebral and cerebellar lesions with posttreatment related changes.     The CNS Tumor Board tumor board considered available treatment options and made the following recommendations: MRI PPF in 3 months.     Clinical Trial Status: N/A     National site-specific guidelines were discussed with respect to the case.

## 2024-10-22 ENCOUNTER — TELEPHONE (OUTPATIENT)
Dept: RADIATION ONCOLOGY | Facility: HOSPITAL | Age: 59
End: 2024-10-22
Payer: COMMERCIAL

## 2024-10-22 NOTE — TELEPHONE ENCOUNTER
Called pt to remind of FUV appointment on 10/24/24 at 3:30. Pt's phone went to voicemail left number if needs to reschedule.

## 2024-10-24 ENCOUNTER — HOSPITAL ENCOUNTER (OUTPATIENT)
Dept: RADIATION ONCOLOGY | Facility: HOSPITAL | Age: 59
Setting detail: RADIATION/ONCOLOGY SERIES
Discharge: HOME | End: 2024-10-24
Payer: COMMERCIAL

## 2024-10-24 ENCOUNTER — HOSPITAL ENCOUNTER (OUTPATIENT)
Dept: RADIOLOGY | Facility: HOSPITAL | Age: 59
Discharge: HOME | End: 2024-10-24
Payer: COMMERCIAL

## 2024-10-24 VITALS
OXYGEN SATURATION: 96 % | HEART RATE: 98 BPM | DIASTOLIC BLOOD PRESSURE: 85 MMHG | SYSTOLIC BLOOD PRESSURE: 123 MMHG | WEIGHT: 164 LBS | RESPIRATION RATE: 18 BRPM | TEMPERATURE: 96.8 F | BODY MASS INDEX: 25.69 KG/M2

## 2024-10-24 DIAGNOSIS — C34.90 PRIMARY MALIGNANT NEOPLASM OF LUNG WITH METASTASIS TO BRAIN (MULTI): ICD-10-CM

## 2024-10-24 DIAGNOSIS — C79.31 PRIMARY MALIGNANT NEOPLASM OF LUNG WITH METASTASIS TO BRAIN (MULTI): ICD-10-CM

## 2024-10-24 DIAGNOSIS — G93.89 BRAIN MASS: Primary | ICD-10-CM

## 2024-10-24 PROCEDURE — A9575 INJ GADOTERATE MEGLUMI 0.1ML: HCPCS | Mod: SE

## 2024-10-24 PROCEDURE — 99214 OFFICE O/P EST MOD 30 MIN: CPT

## 2024-10-24 PROCEDURE — 70553 MRI BRAIN STEM W/O & W/DYE: CPT

## 2024-10-24 PROCEDURE — 2550000001 HC RX 255 CONTRASTS: Mod: SE

## 2024-10-24 RX ORDER — GADOTERATE MEGLUMINE 376.9 MG/ML
20 INJECTION INTRAVENOUS
Status: COMPLETED | OUTPATIENT
Start: 2024-10-24 | End: 2024-10-24

## 2024-10-24 ASSESSMENT — PATIENT HEALTH QUESTIONNAIRE - PHQ9
8. MOVING OR SPEAKING SO SLOWLY THAT OTHER PEOPLE COULD HAVE NOTICED. OR THE OPPOSITE, BEING SO FIGETY OR RESTLESS THAT YOU HAVE BEEN MOVING AROUND A LOT MORE THAN USUAL: NOT AT ALL
SUM OF ALL RESPONSES TO PHQ9 QUESTIONS 1 AND 2: 0
2. FEELING DOWN, DEPRESSED OR HOPELESS: NOT AT ALL
1. LITTLE INTEREST OR PLEASURE IN DOING THINGS: NOT AT ALL
5. POOR APPETITE OR OVEREATING: NOT AT ALL
6. FEELING BAD ABOUT YOURSELF - OR THAT YOU ARE A FAILURE OR HAVE LET YOURSELF OR YOUR FAMILY DOWN: NOT AT ALL
SUM OF ALL RESPONSES TO PHQ QUESTIONS 1-9: 0
3. TROUBLE FALLING OR STAYING ASLEEP OR SLEEPING TOO MUCH: NOT AT ALL
7. TROUBLE CONCENTRATING ON THINGS, SUCH AS READING THE NEWSPAPER OR WATCHING TELEVISION: NOT AT ALL
4. FEELING TIRED OR HAVING LITTLE ENERGY: NOT AT ALL
9. THOUGHTS THAT YOU WOULD BE BETTER OFF DEAD, OR OF HURTING YOURSELF: NOT AT ALL

## 2024-10-24 ASSESSMENT — ENCOUNTER SYMPTOMS
DIFFICULTY URINATING: 0
SEIZURES: 0
FLANK PAIN: 0
DIARRHEA: 0
MYALGIAS: 1
DEPRESSION: 1
CHILLS: 0
NECK PAIN: 1
HEADACHES: 0
EYE PROBLEMS: 1
FATIGUE: 1
BACK PAIN: 1
COUGH: 0
CONSTIPATION: 1
VOMITING: 0
LOSS OF SENSATION IN FEET: 1
SLEEP DISTURBANCE: 1
HEMATURIA: 0
PALPITATIONS: 0
FEVER: 0
DIAPHORESIS: 0
EXTREMITY WEAKNESS: 1
ADENOPATHY: 0
ARTHRALGIAS: 0
TROUBLE SWALLOWING: 0
WHEEZING: 0
NERVOUS/ANXIOUS: 0
SPEECH DIFFICULTY: 1
LIGHT-HEADEDNESS: 1
UNEXPECTED WEIGHT CHANGE: 0
ABDOMINAL DISTENTION: 0
CONFUSION: 0
OCCASIONAL FEELINGS OF UNSTEADINESS: 1
SHORTNESS OF BREATH: 0
ABDOMINAL PAIN: 0
DEPRESSION: 0
DIZZINESS: 0
NAUSEA: 0
CHEST TIGHTNESS: 0

## 2024-10-24 ASSESSMENT — COLUMBIA-SUICIDE SEVERITY RATING SCALE - C-SSRS
2. HAVE YOU ACTUALLY HAD ANY THOUGHTS OF KILLING YOURSELF?: NO
6. HAVE YOU EVER DONE ANYTHING, STARTED TO DO ANYTHING, OR PREPARED TO DO ANYTHING TO END YOUR LIFE?: NO
1. IN THE PAST MONTH, HAVE YOU WISHED YOU WERE DEAD OR WISHED YOU COULD GO TO SLEEP AND NOT WAKE UP?: NO

## 2024-10-24 NOTE — PROGRESS NOTES
"Radiation Oncology Follow-Up    Patient Name:  Anitra Garcia  MRN:  [id number]  :  1965    Referring Provider: Efren Moreno, *  Primary Care Provider: Xiomara Harrell MD  Care Team: Patient Care Team:  Xiomara Harrell MD as PCP - General (Internal Medicine)  Xiomara Harrell MD as PCP - [facility] PCP    Date of Service: 10/24/2024     Diagnosis:   Metastatic ovarian/fallopian tube cancer s/p debulking in      Most recent radiation therapy:  The patient received whole brain radiation therapy (23 Gy/5 fx) from 23-23 which she tolerated well.     SUBJECTIVE  History of Present Illness:   58yoF c metastatic ovarian/fallopian tube cancer s/p debulking in . On maintenance niraparib. Extracranially controlled on 23 CT CAP. Developed imbalance, slurred speech, R facial droop over 3 wks. 23 brain MRI at [facility] = Multiple metastases (3.6 cm L cerebellar, 1 cm R cerebellar, 4.4 cm L cerebellar vermis, 4.5 cm R occipital, 2 cm L corpus callosum, 4.4 cm L medial posterior frontal, 2.6 cm R frontal, 1.6 cm L frontal). Symptoms improved on Dex 4q6 and Protonix. Mild R facial droop on 23 exam, with L dysmetria. She was seen by Radiation Oncology as an inpatient on 23, and elected to undergo palliative radiation treatment of her intracranial disease.  The patient received whole brain radiation therapy (23 Gy/5 fx) from 23-23 which was tolerated well.  Today the patient states that she's doing \"okay\", but is quite fatigued secondary to her systemic treatments.  Since her WBRT, the patient endorses better balance, and improvement of her slurred speech.  Continues to have some issues with fine motor control of her hands which has remained stable.  Patient to resume PT for balance and deconditioning.  Endorses that her headaches have improved greatly.  Continues to struggle with PO intake.  She states that she isn't really hungry, but tries to bridge her calories " with an occasional Ensure.  She follows with a dietitian at [facility] [place].   Denies chills, fever, SOB or chest pain.  Denies seizures, dizziness, diplopia, hearing changes, vision changes or focal sensory/motor changes.  Denies abdominal pain, vomiting or diarrhea.  Does endorses nausea during her systemic treatments.  We discussed the importance of taking her anti emetic medications as a scheduled medication to help, consistently, manage the nausea.  Does endorse some constipation and states that stool softeners don't really help.  We discussed trailing Miralax and she was agreeable.  An MRI of the brain completed today looked relatively stable and told the patient that I'd reach back out to her once the final read is posted.      6/10/24 Interval Visit:   Today the patient is in clinic, accompanied by her sister, for a routine Radiation Oncology FU visit.  Today the patient states that she's doing somewhat better with her mobility/strength s/p PT.  Continues to have some imbalance/generalized weakness when standing which has been stable.  Continues to use a franco when ambulating distances.  Endorses a left occipital headache that radiates to her left neck and shoulder which as been stable.  She rates the pain as a 5/10 and has been managing with Tylenol.   Denies seizures, dizziness, vision changes, or new focal sensory/motor changes.  Continue to have slurred speech which has been stable.  Denies chills, fever, bony pain, SOB, or chest pain.  Denies abdominal pain, pelvic pain, nausea, vomiting or diarrhea.  Has intermittent constipation and manges with a stool softener.  Today we re-reviewed her MRI brain completed on 5/28/24.  Per the Radiology Read, there is again evidence of small satellite lesions along the superior  left cerebellar hemisphere denoted by arrows on the axial post gadolinium volumetric T1 weighted MRI images slice 150 of 256 and 147 of 256 as well as a small linear focus of enhancement along the  superior cerebellar vermis denoted by an arrow on axial post gadolinium volumetric T1 slice 145 of 256 which appear minimally more conspicuous when compared with the prior study dated 02/27/2024. The lesions demonstrate a component of bright signal on the precontrast T1 images which appears slightly more conspicuous when compared with 02/27/2024 raising the possibility of dystrophic calcification/mineralization and/or subacute blood products with a component of suspected minimal superimposed enhancement on the post gadolinium images. There is blooming dark signal on the gradient echo T2 images associated with these lesions suggesting associated blood products/hemosiderin deposition anterior dystrophic calcification/mineralization. The lesions are too small to be further characterized on the obtained advanced MRI imaging. Continued attention to these nonspecific foci on short term follow-up MRI imaging would be recommended.  We discussed the concern regarding the aforementioned satellite lesions and the recommendation of short interval (6-8 weeks) and patient was agreeable to the imaging.     Note: The 5/28/24 MRI brain was discussed with Ravindra Ribeiro MD and he suggested short interval imaging.     7/16/24 Interval Visit:   Today the patient is in clinic, accompanied by her daughter, for a routine Radiation Oncology FU visit and review of an MRI of the brain.  The patient states that she's doing well.   Her generalized weakness had improved a great deal with PT.  She had been walking her driveway to build strength and is now walking the neighborhood.  Her speech is also improved as well.  Denies seizures, dizziness, vision changes, or new focal sensory/motor changes.  Denies chills, fever, bony pain, SOB, or chest pain.  Denies abdominal pain, pelvic pain, nausea, vomiting or diarrhea.  Has intermittent constipation and manges with a stool softener.   We briefly discussed her MRI brain and the difficulty of  "interpretation give that she's had WBRT.   I suggested take her imaging to the 7/17/24 CNS Tumor Board for review.  The patient and daughter were agreeable with the plan.   I told them that I'd call the with recommendations late morning on 7/17/24.     7/17/24 Addendum:    The patients 7/16/24 MRI brain was taken to the 7/17/24 CNS Tumor Board for review.  The consensus was that the imaging is stable as compared to prior imaging.  The recommendation is to repeat an MRI brain with perfusion in 3 months.  This was discussed with the patient and daughter via phone call on 7/17/24. Denies seizures, dizziness, or new focal sensory/motor changes.  She does endorses visual changes.  She states that she sees occasionally sees shadows when looking at objects.  We discussed a referral to Dr. Mccloud (Neuro-ophthalmology) if her vision continues to worsen.  Denies chills, fever, bony pain, SOB, or chest pain.  Denies abdominal pain, pelvic pain, nausea, vomiting or diarrhea.      10/24/24 Interval Visit:   Today the patient is in clinic for a routine Radiation Oncology FU visit and reviw of an MRI brain completed today.  The patient states that she is doing \"okay\".  She continue with PT on a weekly basis for generalized weakness and imbalance.  She states that she continue to have some issue with STM that has been stable.  We discussed going on line and finding some neuro websites such as "[employer]" to help improve her memory and overall sharpness.  She continues to have some garble speech that his worse when she's tired.      Today the patient had an MRI of the brain with perfusion.  At the time of the visit,  there was no Radiology read posted.  I told the patient that I'd give her a call in the morning with the final results.     Treatment Rendered:   Radiation Treatments       No radiation treatments to show. (Treatments may have been administered in another system.)            Review of Systems:   Review of Systems " "  Constitutional:  Positive for fatigue. Negative for chills, diaphoresis, fever and unexpected weight change.   HENT:   Negative for hearing loss, lump/mass, tinnitus and trouble swallowing.    Eyes:  Positive for eye problems (age related.).   Respiratory:  Negative for chest tightness, cough, shortness of breath and wheezing.    Cardiovascular:  Negative for chest pain and palpitations.   Gastrointestinal:  Positive for constipation. Negative for abdominal distention, abdominal pain, diarrhea, nausea and vomiting.   Genitourinary:  Negative for difficulty urinating and hematuria.    Musculoskeletal:  Positive for back pain (upper right back/shoulder.), gait problem (Left occipital), myalgias and neck pain. Negative for arthralgias and flank pain.   Neurological:  Positive for extremity weakness (Generalized weakness.), gait problem (Left occipital), light-headedness (with strenuous activity.) and speech difficulty. Negative for dizziness, headaches and seizures.   Hematological:  Negative for adenopathy.   Psychiatric/Behavioral:  Positive for depression (Being treated at [facility] [place] and sleep disturbance. Negative for confusion. The patient is not nervous/anxious.      The patient's current pain level was assessed.  They report currently having a pain of 3 out of 10 on top of her arms up to the shoulders.   Patient manages with OTC meds.     Performance Status:   The Karnofsky performance scale today is 70, Cares for self; unable to carry on normal activity or to do active work (ECOG equivalent 1).      OBJECTIVE  Vital Signs:      2/27/2024     1:59 PM 2/27/2024     4:04 PM 5/28/2024     9:54 AM 6/10/2024    11:27 AM 7/16/2024    10:03 AM 10/24/2024     3:08 PM 10/24/2024     4:23 PM   Vitals   Systolic  113  103 117  123   Diastolic  81  72 81  85   Heart Rate  110  107 83  98   Temp  36 °C (96.8 °F)  35.9 °C (96.6 °F) 36.2 °C (97.2 °F)  36 °C (96.8 °F)   Resp  18  18 18  18   Height (in) 1.676 m (5' 6\")  1.676 m " "(5' 6\")  1.676 m (5' 6\") 1.702 m (5' 7\")    Weight (lb) 170 169.97 140 139.4 153.2 160 164   BMI 27.44 kg/m2 27.43 kg/m2 22.6 kg/m2 22.5 kg/m2 24.73 kg/m2 25.06 kg/m2 25.69 kg/m2   BSA (m2) 1.89 m2 1.89 m2 1.72 m2 1.72 m2 1.8 m2 1.85 m2 1.88 m2      Physical Exam:  Physical Exam  Constitutional:       General: She is not in acute distress.     Appearance: Normal appearance. She is normal weight. She is not ill-appearing, toxic-appearing or diaphoretic.   HENT:      Head: Normocephalic and atraumatic.      Comments: Thinning hair.      Nose: Nose normal. No congestion or rhinorrhea.      Mouth/Throat:      Mouth: Mucous membranes are moist.   Eyes:      General: Lids are normal. Gaze aligned appropriately.      Extraocular Movements: Extraocular movements intact.      Pupils: Pupils are equal, round, and reactive to light.   Neck:      Thyroid: No thyroid mass or thyroid tenderness.   Cardiovascular:      Rate and Rhythm: Normal rate and regular rhythm.      Pulses: Normal pulses.      Heart sounds: Normal heart sounds. No murmur heard.  Pulmonary:      Effort: Pulmonary effort is normal. No tachypnea or respiratory distress.      Breath sounds: Normal breath sounds. No wheezing or rhonchi.   Abdominal:      General: Abdomen is flat. Bowel sounds are normal. There is no distension.      Palpations: Abdomen is soft. There is no mass.      Tenderness: There is no abdominal tenderness. There is no guarding or rebound.   Musculoskeletal:         General: No swelling, tenderness, deformity or signs of injury. Normal range of motion.      Cervical back: Full passive range of motion without pain, normal range of motion and neck supple. No rigidity or tenderness. No pain with movement. Normal range of motion.      Right lower leg: No edema.      Left lower leg: No edema.   Lymphadenopathy:      Head:      Right side of head: No submental, submandibular, tonsillar, preauricular, posterior auricular or occipital adenopathy.    "   Left side of head: No submental, submandibular, tonsillar, preauricular, posterior auricular or occipital adenopathy.      Cervical:      Right cervical: No superficial, deep or posterior cervical adenopathy.     Left cervical: No superficial, deep or posterior cervical adenopathy.   Skin:     General: Skin is warm and dry.      Capillary Refill: Capillary refill takes less than 2 seconds.      Coloration: Skin is pale. Skin is not jaundiced.      Findings: No erythema, lesion or rash.   Neurological:      General: No focal deficit present.      Mental Status: She is alert and oriented to person, place, and time.      Cranial Nerves: No cranial nerve deficit.      Sensory: No sensory deficit.      Motor: Weakness present. No seizure activity or pronator drift.      Coordination: Romberg sign positive. Coordination abnormal. Finger-Nose-Finger Test and Heel to Shin Test normal.      Gait: Gait abnormal.      Comments: Slurred speech.    Psychiatric:         Attention and Perception: Attention normal.         Mood and Affect: Mood normal.         Behavior: Behavior normal. Behavior is cooperative.        MR BRAIN W AND WO IV CONTRAST; 2/27/2024 3:27 pm  IMPRESSION:  Status post suboccipital craniectomy with surgical mesh. There is  similar appearance of the resection cavity as well as extra-axial  fluid collections superficial to and surrounding the craniectomy.  Several of the previously noted lesions are stable to less pronounced  in comparison to previous MRI dated 11/27/2023, as described above.  There is a punctate new or more conspicuous lesion centered slightly  to the left of midline in the cerebellar vermis, however. Recommend  continued attention on follow-up.    MR BRAIN TUMOR PERFUSION PROTOCOL W AND WO IV CONTRAST;  5/28/2024  11:05 am   IMPRESSION:  Postoperative changes are again identified compatible with a previous  suboccipital craniectomy with surgical mesh overlying the craniectomy  site.       There is again evidence of a surgical resection cavity deep to the  craniotomy site along the posterior left cerebellar hemisphere. A  small amount of bright signal on the precontrast T1 images again  noted within/along the margins of the surgical resection cavity  suggesting a small amount of dystrophic calcification/mineralization.  The gradient echo T2 images again demonstrate blooming dark signal  surrounding the margins of the surgical resection cavity compatible  with hemosiderin deposition anterior dystrophic  calcification/mineralization. The post gadolinium images again  demonstrate mild superimposed enhancement along the margins of the  surgical resection cavity similar in appearance when compared with  the prior examination dated 02/27/2024. The enhancement along the  margins of the surgical resection cavity is too small to be further  characterized on the obtained advanced MRI imaging. There is again  evidence of surrounding nonenhancing bright signal on the FLAIR and  T2 images within the left cerebellar hemisphere minimally more  pronounced when compared with 02/27/2024 which while nonspecific  raises the possibility of mild interval increased surrounding edema  and/or gliosis.      There is again evidence of small satellite lesions along the superior  left cerebellar hemisphere denoted by arrows on the axial post  gadolinium volumetric T1 weighted MRI images slice 150 of 256 and 147  of 256 as well as a small linear focus of enhancement along the  superior cerebellar vermis denoted by an arrow on axial post  gadolinium volumetric T1 slice 145 of 256 which appear minimally more  conspicuous when compared with the prior study dated 02/27/2024. The  lesions demonstrate a component of bright signal on the precontrast  T1 images which appears slightly more conspicuous when compared with  02/27/2024 raising the possibility of dystrophic  calcification/mineralization and/or subacute blood products with  a  component of suspected minimal superimposed enhancement on the post  gadolinium images. There is blooming dark signal on the gradient echo  T2 images associated with these lesions suggesting associated blood  products/hemosiderin deposition anterior dystrophic  calcification/mineralization. The lesions are too small to be further  characterized on the obtained advanced MRI imaging. Continued  attention to these nonspecific foci on short term follow-up MRI  imaging would be recommended.      There is again evidence of a mixed signal hemorrhagic and/or  partially calcified lesion centered within the left corona radiata  demonstrating mildly more conspicuous rim enhancement on the current  study when compared with 02/27/2024. The rim enhancing lesion  measures approximately 12 mm x 10 mm in transaxial dimensions as seen  on axial post gadolinium volumetric T1 slice 89 of 256 by 17 mm in  craniocaudal dimension as seen on coronal post gadolinium volumetric  T1 slice 94 of 200. The lesion again demonstrates a component of  blooming dark signal on the gradient echo T2 images suggesting  associated blood products/hemosiderin deposition anterior dystrophic  calcification/mineralization. The blooming dark signal on the  gradient echo T2 images renders further evaluation on the obtained  advanced MRI perfusion data suspect. That being said, no significant  elevated corrected rCBV values are noted in the region of the this  rim enhancing lesion.      There is again evidence of an approximately 7 mm lesion noted along  the right frontal lobe convexity as seen on axial post gadolinium  volumetric T1 slice 63 of 256 similar when compared with the prior  study dated 02/27/2024. There is again evidence of a small 5 mm  lesion noted along the left frontal lobe convexity similar in  appearance when compared with the prior study dated 02/27/2024. There  is again evidence of blooming dark signal on the gradient echo T2  images  associated with the lesions suggesting associated blood  products/hemosiderin deposition anterior dystrophic  calcification/mineralization.      There is again evidence of a punctate enhancing lesion along the  lateral right cerebellar hemisphere as seen on axial post gadolinium  T1 slice 173 of 256 which appears similar when compared with the  prior study dated 02/27/2024.      There is a punctate enhancing lesion noted along the lateral right  cerebellar hemisphere as seen on axial post gadolinium volumetric T1  slice 179 of 256 which was not clearly visualized on the prior study.  There is a punctate enhancing lesion noted along the lateral right  frontal lobe as seen on axial post gadolinium volumetric T1 slice 96  of 256 which was not well defined on the on the prior study dated  02/27/2024. Continued attention to these punctate lesions on  follow-up imaging is recommended.      The above findings are superimposed upon moderate brain parenchymal  volume loss.      There is again evidence of scattered as well as more ill-defined  patchy and confluent white matter changes within the cerebral  hemispheres bilaterally with the more confluence/ill-defined white  matter changes noted to be more pronounced when compared with the  prior study dated 02/27/2024 and while nonspecific may be post  therapy related with the possibility of sequelae of more remote  small-vessel ischemic changes not excluded.    MR BRAIN TUMOR PERFUSION PROTOCOL W AND WO IV CONTRAST;  7/16/2024  9:47 am  IMPRESSION:  Stable MR appearance of the cerebral and cerebellar lesions and  postoperative/posttreatment changes. Perfusion/permeability imaging  is not useful in this case secondary to insufficient measurable  enhancing lesions and adjacent susceptibility artifact.       OBJECTIVE:   Vital Signs:        11/27/2023     2:46 PM 11/27/2023     3:45 PM 2/27/2024     1:59 PM 2/27/2024     4:04 PM 5/28/2024     9:54 AM 6/10/2024    11:27 AM  "7/16/2024    10:03 AM   Vitals   Systolic  101  113  103 117   Diastolic  74  81  72 81   Heart Rate  115  110  107 83   Temp  36.3 °C (97.3 °F)  36 °C (96.8 °F)  35.9 °C (96.6 °F) 36.2 °C (97.2 °F)   Resp  18  18  18 18   Height (in) 1.676 m (5' 5.98\") 1.651 m (5' 5\") 1.676 m (5' 6\")  1.676 m (5' 6\")  1.676 m (5' 6\")   Weight (lb) 175.93 153.8 170 169.97 140 139.4 153.2   BMI 28.41 kg/m2 25.59 kg/m2 27.44 kg/m2 27.43 kg/m2 22.6 kg/m2 22.5 kg/m2 24.73 kg/m2   BSA (m2) 1.93 m2 1.79 m2 1.89 m2 1.89 m2 1.72 m2 1.72 m2 1.8 m2           Assessment / Plan:   59 yoF c metastatic ovarian/fallopian tube cancer s/p debulking in 2020. On maintenance niraparib. Extracranially controlled on 8/11/23 CT CAP. Developed imbalance, slurred speech, R facial droop over 3 wks. 8/11/23 brain MRI at [place] = Multiple metastases (3.6 cm L cerebellar, 1 cm R cerebellar, 4.4 cm L cerebellar vermis, 4.5 cm R occipital, 2 cm L corpus callosum, 4.4 cm L medial posterior frontal, 2.6 cm R frontal, 1.6 cm L frontal). Symptoms improved on Dex 4q6 and Protonix. Mild R facial droop on 8/14/23 exam, with L dysmetria. She was seen by Radiation Oncology as an inpatient on 8/14/23, and elected to undergo palliative radiation treatment of her intracranial disease.  The patient received whole brain radiation therapy (23 Gy/5 fx) from 8/22/23-8/28/23 which was tolerated well.  Today the patient has a short interval MRI brain to better characterize changes seen on 5/28/24 imaging.  All questions/concerns were addressed to the satisfaction of the patient.     7/17/24 Addendum:    The patients 7/16/24 MRI brain was taken to the 7/17/24 CNS Tumor Board for review.  The consensus was that the imaging is stable as compared to prior imaging.  The recommendation is to repeat an MRI brain with perfusion in 3 months.  This was discussed with the patient and daughter via phone call on 7/17/24.     PLAN:      --MRI brain in 3 months.  --FU with Karson" YESENIA Moreno in 3 months.   --Continue to follow with Dr. Reese for Medical Oncology management.     Please reach out with any questions or concerns.     NCCN Guidelines were applicable to guide this patients treatment plan.  KEILA Negro-CNP

## 2024-10-25 DIAGNOSIS — C79.31 METASTASIS TO BRAIN (MULTI): Primary | ICD-10-CM

## 2024-10-30 ENCOUNTER — APPOINTMENT (OUTPATIENT)
Dept: HEMATOLOGY/ONCOLOGY | Facility: HOSPITAL | Age: 59
End: 2024-10-30
Payer: COMMERCIAL

## 2024-10-30 ENCOUNTER — TELEPHONE (OUTPATIENT)
Dept: RADIATION ONCOLOGY | Facility: HOSPITAL | Age: 59
End: 2024-10-30

## 2024-10-30 DIAGNOSIS — C79.31 METASTASIS TO BRAIN (MULTI): ICD-10-CM

## 2024-10-30 DIAGNOSIS — C79.31 METASTATIC CANCER TO BRAIN (MULTI): ICD-10-CM

## 2024-10-30 DIAGNOSIS — C34.90 PRIMARY MALIGNANT NEOPLASM OF LUNG WITH METASTASIS TO BRAIN (MULTI): Primary | ICD-10-CM

## 2024-10-30 DIAGNOSIS — C79.31 PRIMARY MALIGNANT NEOPLASM OF LUNG WITH METASTASIS TO BRAIN (MULTI): Primary | ICD-10-CM

## 2024-10-30 RX ORDER — DEXAMETHASONE 2 MG/1
2 TABLET ORAL DAILY
Qty: 30 TABLET | Refills: 2 | Status: SHIPPED | OUTPATIENT
Start: 2024-10-30 | End: 2025-01-28

## 2024-12-17 DIAGNOSIS — C79.31 METASTATIC CANCER TO BRAIN (MULTI): Primary | ICD-10-CM

## 2024-12-17 DIAGNOSIS — C79.31 METASTASIS TO BRAIN (MULTI): ICD-10-CM

## 2024-12-24 ENCOUNTER — HOSPITAL ENCOUNTER (OUTPATIENT)
Dept: RADIOLOGY | Facility: HOSPITAL | Age: 59
Discharge: HOME | End: 2024-12-24
Payer: COMMERCIAL

## 2024-12-24 DIAGNOSIS — C79.31 METASTASIS TO BRAIN (MULTI): ICD-10-CM

## 2024-12-24 DIAGNOSIS — C79.31 METASTATIC CANCER TO BRAIN (MULTI): ICD-10-CM

## 2024-12-24 PROCEDURE — 2550000001 HC RX 255 CONTRASTS

## 2024-12-24 PROCEDURE — A9575 INJ GADOTERATE MEGLUMI 0.1ML: HCPCS

## 2024-12-24 PROCEDURE — 70553 MRI BRAIN STEM W/O & W/DYE: CPT | Performed by: RADIOLOGY

## 2024-12-24 PROCEDURE — 70553 MRI BRAIN STEM W/O & W/DYE: CPT

## 2024-12-24 RX ORDER — GADOTERATE MEGLUMINE 376.9 MG/ML
24 INJECTION INTRAVENOUS
Status: COMPLETED | OUTPATIENT
Start: 2024-12-24 | End: 2024-12-24

## 2024-12-29 ENCOUNTER — APPOINTMENT (OUTPATIENT)
Dept: RADIOLOGY | Facility: HOSPITAL | Age: 59
End: 2024-12-29
Payer: COMMERCIAL

## 2025-01-02 ENCOUNTER — TELEPHONE (OUTPATIENT)
Dept: RADIATION ONCOLOGY | Facility: HOSPITAL | Age: 60
End: 2025-01-02
Payer: COMMERCIAL

## 2025-01-02 DIAGNOSIS — C79.31 METASTATIC CANCER TO BRAIN (MULTI): Primary | ICD-10-CM

## 2025-01-02 NOTE — TELEPHONE ENCOUNTER
Spoke to the daughter, Joy, today.  She stated that her mom has had recent episodes of aphasia and freezing up.   A recent MRI brain showed stable disease.  I suggested that her mom see Neurology to further characterize.  All questions/concerns were addressed.

## 2025-01-15 DIAGNOSIS — C34.90 PRIMARY MALIGNANT NEOPLASM OF LUNG WITH METASTASIS TO BRAIN (MULTI): Primary | ICD-10-CM

## 2025-01-15 DIAGNOSIS — C79.31 PRIMARY MALIGNANT NEOPLASM OF LUNG WITH METASTASIS TO BRAIN (MULTI): Primary | ICD-10-CM

## 2025-02-03 ENCOUNTER — APPOINTMENT (OUTPATIENT)
Dept: RADIOLOGY | Facility: HOSPITAL | Age: 60
End: 2025-02-03
Payer: COMMERCIAL

## 2025-02-03 ENCOUNTER — APPOINTMENT (OUTPATIENT)
Dept: RADIATION ONCOLOGY | Facility: HOSPITAL | Age: 60
End: 2025-02-03
Payer: COMMERCIAL

## 2025-03-14 DIAGNOSIS — G93.89 BRAIN MASS: Primary | ICD-10-CM

## 2025-03-25 ENCOUNTER — APPOINTMENT (OUTPATIENT)
Dept: NEUROLOGY | Facility: CLINIC | Age: 60
End: 2025-03-25
Payer: COMMERCIAL

## 2025-04-04 ENCOUNTER — TELEPHONE (OUTPATIENT)
Dept: RADIATION ONCOLOGY | Facility: HOSPITAL | Age: 60
End: 2025-04-04
Payer: COMMERCIAL

## 2025-04-07 ENCOUNTER — HOSPITAL ENCOUNTER (OUTPATIENT)
Dept: RADIOLOGY | Facility: HOSPITAL | Age: 60
Discharge: HOME | End: 2025-04-07
Payer: COMMERCIAL

## 2025-04-07 ENCOUNTER — HOSPITAL ENCOUNTER (OUTPATIENT)
Dept: RADIATION ONCOLOGY | Facility: HOSPITAL | Age: 60
Setting detail: RADIATION/ONCOLOGY SERIES
Discharge: HOME | End: 2025-04-07
Payer: COMMERCIAL

## 2025-04-07 VITALS
DIASTOLIC BLOOD PRESSURE: 78 MMHG | SYSTOLIC BLOOD PRESSURE: 114 MMHG | TEMPERATURE: 97.7 F | WEIGHT: 165.8 LBS | RESPIRATION RATE: 18 BRPM | OXYGEN SATURATION: 97 % | HEART RATE: 104 BPM | BODY MASS INDEX: 25.97 KG/M2

## 2025-04-07 DIAGNOSIS — C79.31 PRIMARY MALIGNANT NEOPLASM OF LUNG WITH METASTASIS TO BRAIN (MULTI): ICD-10-CM

## 2025-04-07 DIAGNOSIS — G93.89 BRAIN MASS: Primary | ICD-10-CM

## 2025-04-07 DIAGNOSIS — C34.90 PRIMARY MALIGNANT NEOPLASM OF LUNG WITH METASTASIS TO BRAIN (MULTI): ICD-10-CM

## 2025-04-07 PROCEDURE — 99214 OFFICE O/P EST MOD 30 MIN: CPT

## 2025-04-07 PROCEDURE — A9575 INJ GADOTERATE MEGLUMI 0.1ML: HCPCS

## 2025-04-07 PROCEDURE — 2550000001 HC RX 255 CONTRASTS

## 2025-04-07 PROCEDURE — 70553 MRI BRAIN STEM W/O & W/DYE: CPT

## 2025-04-07 RX ORDER — LIDOCAINE AND PRILOCAINE 25; 25 MG/G; MG/G
CREAM TOPICAL AS NEEDED
COMMUNITY
Start: 2023-10-06

## 2025-04-07 RX ORDER — TALC
3 POWDER (GRAM) TOPICAL
COMMUNITY
Start: 2024-01-26

## 2025-04-07 RX ORDER — MIRTAZAPINE 15 MG/1
15 TABLET, FILM COATED ORAL NIGHTLY
COMMUNITY
Start: 2024-03-29

## 2025-04-07 RX ORDER — GADOTERATE MEGLUMINE 376.9 MG/ML
21 INJECTION INTRAVENOUS
Status: COMPLETED | OUTPATIENT
Start: 2025-04-07 | End: 2025-04-07

## 2025-04-07 RX ADMIN — GADOTERATE MEGLUMINE 21 ML: 376.9 INJECTION INTRAVENOUS at 15:43

## 2025-04-07 ASSESSMENT — ENCOUNTER SYMPTOMS
OCCASIONAL FEELINGS OF UNSTEADINESS: 1
UNEXPECTED WEIGHT CHANGE: 0
PALPITATIONS: 0
FATIGUE: 1
COUGH: 0
ADENOPATHY: 0
HEMATURIA: 0
SEIZURES: 0
NAUSEA: 0
WHEEZING: 0
TROUBLE SWALLOWING: 0
LIGHT-HEADEDNESS: 1
CHILLS: 0
DEPRESSION: 1
SLEEP DISTURBANCE: 1
EXTREMITY WEAKNESS: 1
DIARRHEA: 0
ABDOMINAL DISTENTION: 0
CONFUSION: 0
NERVOUS/ANXIOUS: 0
DIZZINESS: 1
FLANK PAIN: 0
BACK PAIN: 1
EYE PROBLEMS: 1
VOMITING: 0
ABDOMINAL PAIN: 0
DIFFICULTY URINATING: 0
SPEECH DIFFICULTY: 1
DIAPHORESIS: 0
CONSTIPATION: 0
NECK PAIN: 1
FEVER: 0
CHEST TIGHTNESS: 0
MYALGIAS: 1
ARTHRALGIAS: 0
LOSS OF SENSATION IN FEET: 1
SHORTNESS OF BREATH: 0
HEADACHES: 0

## 2025-04-07 ASSESSMENT — PATIENT HEALTH QUESTIONNAIRE - PHQ9
SUM OF ALL RESPONSES TO PHQ QUESTIONS 1-9: 6
8. MOVING OR SPEAKING SO SLOWLY THAT OTHER PEOPLE COULD HAVE NOTICED. OR THE OPPOSITE, BEING SO FIGETY OR RESTLESS THAT YOU HAVE BEEN MOVING AROUND A LOT MORE THAN USUAL: SEVERAL DAYS
5. POOR APPETITE OR OVEREATING: NOT AT ALL
4. FEELING TIRED OR HAVING LITTLE ENERGY: SEVERAL DAYS
SUM OF ALL RESPONSES TO PHQ9 QUESTIONS 1 AND 2: 2
3. TROUBLE FALLING OR STAYING ASLEEP OR SLEEPING TOO MUCH: SEVERAL DAYS
10. IF YOU CHECKED OFF ANY PROBLEMS, HOW DIFFICULT HAVE THESE PROBLEMS MADE IT FOR YOU TO DO YOUR WORK, TAKE CARE OF THINGS AT HOME, OR GET ALONG WITH OTHER PEOPLE: SOMEWHAT DIFFICULT
9. THOUGHTS THAT YOU WOULD BE BETTER OFF DEAD, OR OF HURTING YOURSELF: NOT AT ALL
6. FEELING BAD ABOUT YOURSELF - OR THAT YOU ARE A FAILURE OR HAVE LET YOURSELF OR YOUR FAMILY DOWN: NOT AT ALL
7. TROUBLE CONCENTRATING ON THINGS, SUCH AS READING THE NEWSPAPER OR WATCHING TELEVISION: SEVERAL DAYS
2. FEELING DOWN, DEPRESSED OR HOPELESS: SEVERAL DAYS
1. LITTLE INTEREST OR PLEASURE IN DOING THINGS: SEVERAL DAYS

## 2025-04-07 ASSESSMENT — PAIN SCALES - GENERAL: PAINLEVEL_OUTOF10: 0-NO PAIN

## 2025-04-07 NOTE — PROGRESS NOTES
"Radiation Oncology Follow-Up    Patient Name:  Anitra Garcia  MRN:  [id number]  :  1965    Referring Provider: Efren Moreno, *  Primary Care Provider: Xiomara Harrell MD  Care Team: Patient Care Team:  Xiomara Harrell MD as PCP - General (Internal Medicine)    Date of Service: 2025     Diagnosis:   Metastatic ovarian/fallopian tube cancer s/p debulking in      Most recent radiation therapy:  The patient received whole brain radiation therapy (23 Gy/5 fx) from 23-23 which she tolerated well.     SUBJECTIVE  History of Present Illness:   58yoF c metastatic ovarian/fallopian tube cancer s/p debulking in . On maintenance niraparib. Extracranially controlled on 23 CT CAP. Developed imbalance, slurred speech, R facial droop over 3 wks. 23 brain MRI at [facility] = Multiple metastases (3.6 cm L cerebellar, 1 cm R cerebellar, 4.4 cm L cerebellar vermis, 4.5 cm R occipital, 2 cm L corpus callosum, 4.4 cm L medial posterior frontal, 2.6 cm R frontal, 1.6 cm L frontal). Symptoms improved on Dex 4q6 and Protonix. Mild R facial droop on 23 exam, with L dysmetria. She was seen by Radiation Oncology as an inpatient on 23, and elected to undergo palliative radiation treatment of her intracranial disease.  The patient received whole brain radiation therapy (23 Gy/5 fx) from 23-23 which was tolerated well.  Today the patient states that she's doing \"okay\", but is quite fatigued secondary to her systemic treatments.  Since her WBRT, the patient endorses better balance, and improvement of her slurred speech.  Continues to have some issues with fine motor control of her hands which has remained stable.  Patient to resume PT for balance and deconditioning.  Endorses that her headaches have improved greatly.  Continues to struggle with PO intake.  She states that she isn't really hungry, but tries to bridge her calories with an occasional Ensure.  She follows with " a dietitian at [facility] [place].   Denies chills, fever, SOB or chest pain.  Denies seizures, dizziness, diplopia, hearing changes, vision changes or focal sensory/motor changes.  Denies abdominal pain, vomiting or diarrhea.  Does endorses nausea during her systemic treatments.  We discussed the importance of taking her anti emetic medications as a scheduled medication to help, consistently, manage the nausea.  Does endorse some constipation and states that stool softeners don't really help.  We discussed trailing Miralax and she was agreeable.  An MRI of the brain completed today looked relatively stable and told the patient that I'd reach back out to her once the final read is posted.      6/10/24 Interval Visit:   Today the patient is in clinic, accompanied by her sister, for a routine Radiation Oncology FU visit.  Today the patient states that she's doing somewhat better with her mobility/strength s/p PT.  Continues to have some imbalance/generalized weakness when standing which has been stable.  Continues to use a franco when ambulating distances.  Endorses a left occipital headache that radiates to her left neck and shoulder which as been stable.  She rates the pain as a 5/10 and has been managing with Tylenol.   Denies seizures, dizziness, vision changes, or new focal sensory/motor changes.  Continue to have slurred speech which has been stable.  Denies chills, fever, bony pain, SOB, or chest pain.  Denies abdominal pain, pelvic pain, nausea, vomiting or diarrhea.  Has intermittent constipation and manges with a stool softener.  Today we re-reviewed her MRI brain completed on 5/28/24.  Per the Radiology Read, there is again evidence of small satellite lesions along the superior  left cerebellar hemisphere denoted by arrows on the axial post gadolinium volumetric T1 weighted MRI images slice 150 of 256 and 147 of 256 as well as a small linear focus of enhancement along the superior cerebellar vermis denoted by an arrow on  axial post gadolinium volumetric T1 slice 145 of 256 which appear minimally more conspicuous when compared with the prior study dated 02/27/2024. The lesions demonstrate a component of bright signal on the precontrast T1 images which appears slightly more conspicuous when compared with 02/27/2024 raising the possibility of dystrophic calcification/mineralization and/or subacute blood products with a component of suspected minimal superimposed enhancement on the post gadolinium images. There is blooming dark signal on the gradient echo T2 images associated with these lesions suggesting associated blood products/hemosiderin deposition anterior dystrophic calcification/mineralization. The lesions are too small to be further characterized on the obtained advanced MRI imaging. Continued attention to these nonspecific foci on short term follow-up MRI imaging would be recommended.  We discussed the concern regarding the aforementioned satellite lesions and the recommendation of short interval (6-8 weeks) and patient was agreeable to the imaging.     Note: The 5/28/24 MRI brain was discussed with Ravindra Ribeiro MD and he suggested short interval imaging.     7/16/24 Interval Visit:   Today the patient is in clinic, accompanied by her daughter, for a routine Radiation Oncology FU visit and review of an MRI of the brain.  The patient states that she's doing well.   Her generalized weakness had improved a great deal with PT.  She had been walking her driveway to build strength and is now walking the neighborhood.  Her speech is also improved as well.  Denies seizures, dizziness, vision changes, or new focal sensory/motor changes.  Denies chills, fever, bony pain, SOB, or chest pain.  Denies abdominal pain, pelvic pain, nausea, vomiting or diarrhea.  Has intermittent constipation and manges with a stool softener.   We briefly discussed her MRI brain and the difficulty of interpretation give that she's had WBRT.   I suggested  "take her imaging to the 7/17/24 CNS Tumor Board for review.  The patient and daughter were agreeable with the plan.   I told them that I'd call the with recommendations late morning on 7/17/24.     7/17/24 Addendum:    The patients 7/16/24 MRI brain was taken to the 7/17/24 CNS Tumor Board for review.  The consensus was that the imaging is stable as compared to prior imaging.  The recommendation is to repeat an MRI brain with perfusion in 3 months.  This was discussed with the patient and daughter via phone call on 7/17/24. Denies seizures, dizziness, or new focal sensory/motor changes.  She does endorses visual changes.  She states that she sees occasionally sees shadows when looking at objects.  We discussed a referral to Dr. Mccloud (Neuro-ophthalmology) if her vision continues to worsen.  Denies chills, fever, bony pain, SOB, or chest pain.  Denies abdominal pain, pelvic pain, nausea, vomiting or diarrhea.      10/24/24 Interval Visit:   Today the patient is in clinic for a routine Radiation Oncology FU visit and reviw of an MRI brain completed today.  The patient states that she is doing \"okay\".  She continue with PT on a weekly basis for generalized weakness and imbalance.  She states that she continue to have some issue with STM that has been stable.  We discussed going on line and finding some neuro websites such as [employer] to help improve her memory and overall sharpness.  She continues to have some garble speech that his worse when she's tired.      Today the patient had an MRI of the brain with perfusion.  At the time of the visit,  there was no Radiology read posted.  I told the patient that I'd give her a call in the morning with the final results.     4/7/25 Interval Visit:   Today the patient is in clinic for a routine Radiation Oncology FU visit and review of an MRI brain completed today.  Overall, the patient states that her neuro status has been pretty much stable.  She continues to have " generalized weakness and balance issue which requires her to use a tripod cane.  She also continues to have some slowness of speech and occasional word finding that has been stable.  Denies seizures, or new focal sensory/motor deficits.  She does endorse an occasional headache, but nothing out of the ordinary.  She manages with OTC Tylenol.  She also endorses some dizziness when she tilts her head back.  Denies chills, fever, fatigue, SOB or chest pain.  Denies abdominal pain, nausea, vomiting, diarrhea or constipation.      Results of an MRI brain with perfusion, posted today, were not posted at the time of this visit.  I told the patient that I'd reach out to her once a final read had been posted.     Treatment Rendered:   Radiation Treatments       No radiation treatments to show. (Treatments may have been administered in another system.)            Review of Systems:   Review of Systems   Constitutional:  Positive for fatigue. Negative for chills, diaphoresis, fever and unexpected weight change.   HENT:   Negative for hearing loss, lump/mass, tinnitus and trouble swallowing.    Eyes:  Positive for eye problems (age related.).   Respiratory:  Negative for chest tightness, cough, shortness of breath and wheezing.    Cardiovascular:  Negative for chest pain and palpitations.   Gastrointestinal:  Negative for abdominal distention, abdominal pain, constipation, diarrhea, nausea and vomiting.   Genitourinary:  Negative for difficulty urinating and hematuria.    Musculoskeletal:  Positive for back pain (upper right back/shoulder.), gait problem (Left occipital), myalgias and neck pain. Negative for arthralgias and flank pain.   Neurological:  Positive for dizziness, extremity weakness (Generalized weakness.), gait problem (Left occipital), light-headedness (with strenuous activity.) and speech difficulty. Negative for headaches and seizures.   Hematological:  Negative for adenopathy.   Psychiatric/Behavioral:  Positive  "for depression (Being treated at [facility] and sleep disturbance. Negative for confusion. The patient is not nervous/anxious.      The patient's current pain level was assessed.  They report currently having a pain of 3 out of 10 on top of her arms up to the shoulders.   Patient manages with OTC meds.     Performance Status:   The Karnofsky performance scale today is 70, Cares for self; unable to carry on normal activity or to do active work (ECOG equivalent 1).      OBJECTIVE  Vital Signs:      6/10/2024    11:27 AM 7/16/2024    10:03 AM 10/24/2024     3:08 PM 10/24/2024     4:23 PM 12/24/2024     5:40 PM 4/7/2025     3:20 PM 4/7/2025     4:31 PM   Vitals   Systolic 103 117  123   114   Diastolic 72 81  85   78   BP Location       Right arm   Heart Rate 107 83  98   104   Temp 35.9 °C (96.6 °F) 36.2 °C (97.2 °F)  36 °C (96.8 °F)   36.5 °C (97.7 °F)   Resp 18 18  18   18   Height  1.676 m (5' 6\") 1.702 m (5' 7\")       Weight (lb) 139.4 153.2 160 164 171 166 165.8   BMI 22.5 kg/m2 24.73 kg/m2 25.06 kg/m2 25.69 kg/m2 26.78 kg/m2 26 kg/m2 25.97 kg/m2   BSA (m2) 1.72 m2 1.8 m2 1.85 m2 1.88 m2 1.92 m2 1.89 m2 1.89 m2      Physical Exam:  Physical Exam  Constitutional:       General: She is not in acute distress.     Appearance: Normal appearance. She is normal weight. She is not ill-appearing, toxic-appearing or diaphoretic.   HENT:      Head: Normocephalic and atraumatic.      Comments: Thinning hair.      Nose: Nose normal. No congestion or rhinorrhea.      Mouth/Throat:      Mouth: Mucous membranes are moist.   Eyes:      General: Lids are normal. Gaze aligned appropriately.      Extraocular Movements: Extraocular movements intact.      Pupils: Pupils are equal, round, and reactive to light.   Neck:      Thyroid: No thyroid mass or thyroid tenderness.   Cardiovascular:      Rate and Rhythm: Normal rate and regular rhythm.      Pulses: Normal pulses.      Heart sounds: Normal heart sounds. No murmur heard.  Pulmonary:      " Effort: Pulmonary effort is normal. No tachypnea or respiratory distress.      Breath sounds: Normal breath sounds. No wheezing or rhonchi.   Abdominal:      General: Abdomen is flat. Bowel sounds are normal. There is no distension.      Palpations: Abdomen is soft. There is no mass.      Tenderness: There is no abdominal tenderness. There is no guarding or rebound.   Musculoskeletal:         General: No swelling, tenderness, deformity or signs of injury. Normal range of motion.      Cervical back: Full passive range of motion without pain, normal range of motion and neck supple. No rigidity or tenderness. No pain with movement. Normal range of motion.      Right lower leg: No edema.      Left lower leg: No edema.   Lymphadenopathy:      Head:      Right side of head: No submental, submandibular, tonsillar, preauricular, posterior auricular or occipital adenopathy.      Left side of head: No submental, submandibular, tonsillar, preauricular, posterior auricular or occipital adenopathy.      Cervical:      Right cervical: No superficial, deep or posterior cervical adenopathy.     Left cervical: No superficial, deep or posterior cervical adenopathy.   Skin:     General: Skin is warm and dry.      Capillary Refill: Capillary refill takes less than 2 seconds.      Coloration: Skin is not jaundiced or pale.      Findings: No erythema, lesion or rash.   Neurological:      General: No focal deficit present.      Mental Status: She is alert and oriented to person, place, and time.      Cranial Nerves: No cranial nerve deficit.      Sensory: No sensory deficit.      Motor: Weakness present. No seizure activity or pronator drift.      Coordination: Romberg sign positive. Coordination abnormal. Finger-Nose-Finger Test and Heel to Shin Test normal.      Gait: Gait abnormal.      Comments: Slurred speech.    Psychiatric:         Attention and Perception: Attention normal.         Mood and Affect: Mood normal.         Behavior:  Behavior normal. Behavior is cooperative.        MR BRAIN W AND WO IV CONTRAST; 2/27/2024 3:27 pm  IMPRESSION:  Status post suboccipital craniectomy with surgical mesh. There is  similar appearance of the resection cavity as well as extra-axial  fluid collections superficial to and surrounding the craniectomy.  Several of the previously noted lesions are stable to less pronounced  in comparison to previous MRI dated 11/27/2023, as described above.  There is a punctate new or more conspicuous lesion centered slightly  to the left of midline in the cerebellar vermis, however. Recommend  continued attention on follow-up.    MR BRAIN TUMOR PERFUSION PROTOCOL W AND WO IV CONTRAST;  5/28/2024  11:05 am   IMPRESSION:  Postoperative changes are again identified compatible with a previous  suboccipital craniectomy with surgical mesh overlying the craniectomy  site.      There is again evidence of a surgical resection cavity deep to the  craniotomy site along the posterior left cerebellar hemisphere. A  small amount of bright signal on the precontrast T1 images again  noted within/along the margins of the surgical resection cavity  suggesting a small amount of dystrophic calcification/mineralization.  The gradient echo T2 images again demonstrate blooming dark signal  surrounding the margins of the surgical resection cavity compatible  with hemosiderin deposition anterior dystrophic  calcification/mineralization. The post gadolinium images again  demonstrate mild superimposed enhancement along the margins of the  surgical resection cavity similar in appearance when compared with  the prior examination dated 02/27/2024. The enhancement along the  margins of the surgical resection cavity is too small to be further  characterized on the obtained advanced MRI imaging. There is again  evidence of surrounding nonenhancing bright signal on the FLAIR and  T2 images within the left cerebellar hemisphere minimally more  pronounced when  compared with 02/27/2024 which while nonspecific  raises the possibility of mild interval increased surrounding edema  and/or gliosis.      There is again evidence of small satellite lesions along the superior  left cerebellar hemisphere denoted by arrows on the axial post  gadolinium volumetric T1 weighted MRI images slice 150 of 256 and 147  of 256 as well as a small linear focus of enhancement along the  superior cerebellar vermis denoted by an arrow on axial post  gadolinium volumetric T1 slice 145 of 256 which appear minimally more  conspicuous when compared with the prior study dated 02/27/2024. The  lesions demonstrate a component of bright signal on the precontrast  T1 images which appears slightly more conspicuous when compared with  02/27/2024 raising the possibility of dystrophic  calcification/mineralization and/or subacute blood products with a  component of suspected minimal superimposed enhancement on the post  gadolinium images. There is blooming dark signal on the gradient echo  T2 images associated with these lesions suggesting associated blood  products/hemosiderin deposition anterior dystrophic  calcification/mineralization. The lesions are too small to be further  characterized on the obtained advanced MRI imaging. Continued  attention to these nonspecific foci on short term follow-up MRI  imaging would be recommended.      There is again evidence of a mixed signal hemorrhagic and/or  partially calcified lesion centered within the left corona radiata  demonstrating mildly more conspicuous rim enhancement on the current  study when compared with 02/27/2024. The rim enhancing lesion  measures approximately 12 mm x 10 mm in transaxial dimensions as seen  on axial post gadolinium volumetric T1 slice 89 of 256 by 17 mm in  craniocaudal dimension as seen on coronal post gadolinium volumetric  T1 slice 94 of 200. The lesion again demonstrates a component of  blooming dark signal on the gradient echo T2  images suggesting  associated blood products/hemosiderin deposition anterior dystrophic  calcification/mineralization. The blooming dark signal on the  gradient echo T2 images renders further evaluation on the obtained  advanced MRI perfusion data suspect. That being said, no significant  elevated corrected rCBV values are noted in the region of the this  rim enhancing lesion.      There is again evidence of an approximately 7 mm lesion noted along  the right frontal lobe convexity as seen on axial post gadolinium  volumetric T1 slice 63 of 256 similar when compared with the prior  study dated 02/27/2024. There is again evidence of a small 5 mm  lesion noted along the left frontal lobe convexity similar in  appearance when compared with the prior study dated 02/27/2024. There  is again evidence of blooming dark signal on the gradient echo T2  images associated with the lesions suggesting associated blood  products/hemosiderin deposition anterior dystrophic  calcification/mineralization.      There is again evidence of a punctate enhancing lesion along the  lateral right cerebellar hemisphere as seen on axial post gadolinium  T1 slice 173 of 256 which appears similar when compared with the  prior study dated 02/27/2024.      There is a punctate enhancing lesion noted along the lateral right  cerebellar hemisphere as seen on axial post gadolinium volumetric T1  slice 179 of 256 which was not clearly visualized on the prior study.  There is a punctate enhancing lesion noted along the lateral right  frontal lobe as seen on axial post gadolinium volumetric T1 slice 96  of 256 which was not well defined on the on the prior study dated  02/27/2024. Continued attention to these punctate lesions on  follow-up imaging is recommended.      The above findings are superimposed upon moderate brain parenchymal  volume loss.      There is again evidence of scattered as well as more ill-defined  patchy and confluent white matter  "changes within the cerebral  hemispheres bilaterally with the more confluence/ill-defined white  matter changes noted to be more pronounced when compared with the  prior study dated 02/27/2024 and while nonspecific may be post  therapy related with the possibility of sequelae of more remote  small-vessel ischemic changes not excluded.    MR BRAIN TUMOR PERFUSION PROTOCOL W AND WO IV CONTRAST;  7/16/2024  9:47 am  IMPRESSION:  Stable MR appearance of the cerebral and cerebellar lesions and  postoperative/posttreatment changes. Perfusion/permeability imaging  is not useful in this case secondary to insufficient measurable  enhancing lesions and adjacent susceptibility artifact.       OBJECTIVE:   Vital Signs:        6/10/2024    11:27 AM 7/16/2024    10:03 AM 10/24/2024     3:08 PM 10/24/2024     4:23 PM 12/24/2024     5:40 PM 4/7/2025     3:20 PM 4/7/2025     4:31 PM   Vitals   Systolic 103 117  123   114   Diastolic 72 81  85   78   BP Location       Right arm   Heart Rate 107 83  98   104   Temp 35.9 °C (96.6 °F) 36.2 °C (97.2 °F)  36 °C (96.8 °F)   36.5 °C (97.7 °F)   Resp 18 18  18   18   Height  1.676 m (5' 6\") 1.702 m (5' 7\")       Weight (lb) 139.4 153.2 160 164 171 166 165.8   BMI 22.5 kg/m2 24.73 kg/m2 25.06 kg/m2 25.69 kg/m2 26.78 kg/m2 26 kg/m2 25.97 kg/m2   BSA (m2) 1.72 m2 1.8 m2 1.85 m2 1.88 m2 1.92 m2 1.89 m2 1.89 m2           Assessment / Plan:   59 yoF c metastatic ovarian/fallopian tube cancer s/p debulking in 2020. On maintenance niraparib. Extracranially controlled on 8/11/23 CT CAP. Developed imbalance, slurred speech, R facial droop over 3 wks. 8/11/23 brain MRI at [facility] = Multiple metastases (3.6 cm L cerebellar, 1 cm R cerebellar, 4.4 cm L cerebellar vermis, 4.5 cm R occipital, 2 cm L corpus callosum, 4.4 cm L medial posterior frontal, 2.6 cm R frontal, 1.6 cm L frontal). Symptoms improved on Dex 4q6 and Protonix. Mild R facial droop on 8/14/23 exam, with L dysmetria. She was seen " by Radiation Oncology as an inpatient on 8/14/23, and elected to undergo palliative radiation treatment of her intracranial disease.  The patient received whole brain radiation therapy (23 Gy/5 fx) from 8/22/23-8/28/23 which was tolerated well.  All questions/concerns were addressed to the satisfaction of the patient.     Results of an MRI brain with perfusion, posted today, were not posted at the time of this visit.  I told the patient that I'd reach out to her once a final read had been posted.     PLAN:      --MRI brain in 3 months.  --FU with Karson Moreno CNP in 3 months.   --Continue to follow with Dr. Reese for Medical Oncology management.     Please reach out with any questions or concerns.     NCCN Guidelines were applicable to guide this patients treatment plan.  KEILA Negro-CNP

## 2025-04-08 ENCOUNTER — TELEPHONE (OUTPATIENT)
Dept: RADIATION ONCOLOGY | Facility: HOSPITAL | Age: 60
End: 2025-04-08
Payer: COMMERCIAL

## 2025-04-08 DIAGNOSIS — C79.31 METASTASIS TO BRAIN (MULTI): Primary | ICD-10-CM

## 2025-04-08 NOTE — TELEPHONE ENCOUNTER
Called the patient this morning regarding her 4/7/25 MRI brain.  Per a Radiology read, the imaging looks mostly stable with the exception of an enlarging lesion in the right cerebellum.  I suggested adding the imaging to the 4/16/25 CNS TB for further characterization.  A call back number was left in my message.

## 2025-04-16 ENCOUNTER — APPOINTMENT (OUTPATIENT)
Dept: HEMATOLOGY/ONCOLOGY | Facility: HOSPITAL | Age: 60
End: 2025-04-16
Payer: COMMERCIAL

## 2025-04-16 NOTE — TUMOR BOARD NOTE
CNS Tumor Board Recommendations       Patient was presented by Efren Moreno CNP at our CNS Tumor Board on 04/16/2025 which included representatives from Radiation oncology, Surgical oncology, Neuro-oncology, Pathology, Radiology, Research, Neurosurgery, Social Work (Neurosurgery).     Current patient presents with history of the following treatment history: PMH peritoneal carcinoma s/p debulking (2020), anxiety, depression, presented with facial droop and dizziness in 09/2023 found to have multiple metastases brain mass. Now s/p SOC for tumor resection 08/29/24. 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 Treated with WBRT 23Gy x 5 fractions 08/22/23-08/28/23.     MRI PPF 04/2025: Majority of lesions are stable. Enlargement of peripherally enhancing lesion right cerebellar and anteriorlateral right cerebellum.     The CNS Tumor Board tumor board considered available treatment options and made the following recommendations: SRS / GKRS to right cerebellum lesions with worsening enhancement.     Clinical Trial Status: N/A     National site-specific guidelines were discussed with respect to the case.

## 2025-04-17 ENCOUNTER — TELEPHONE (OUTPATIENT)
Dept: RADIATION ONCOLOGY | Facility: HOSPITAL | Age: 60
End: 2025-04-17
Payer: COMMERCIAL

## 2025-04-17 NOTE — TELEPHONE ENCOUNTER
I had called the patient on 4/16/25 regarding review of her recent brain MRI and recommendations from the 4/16/25 CNS TB.  Per the board, the recommendation is SRS to a right cerebellum lesion and right medial frontal/parietal lesion.  The patient stated that she wanted to speak to her family before making any decisions.  I received a secure chat from the patients daughter this morning stating that the patient wanted to move forward with treatment.  This information was relayed to the treating team.

## 2025-04-21 ENCOUNTER — HOSPITAL ENCOUNTER (OUTPATIENT)
Dept: RADIATION ONCOLOGY | Facility: CLINIC | Age: 60
Setting detail: RADIATION/ONCOLOGY SERIES
Discharge: HOME | End: 2025-04-21
Payer: COMMERCIAL

## 2025-04-21 ENCOUNTER — APPOINTMENT (OUTPATIENT)
Dept: RADIATION ONCOLOGY | Facility: HOSPITAL | Age: 60
End: 2025-04-21
Payer: COMMERCIAL

## 2025-04-21 VITALS
WEIGHT: 163.14 LBS | TEMPERATURE: 97 F | RESPIRATION RATE: 18 BRPM | HEIGHT: 64 IN | DIASTOLIC BLOOD PRESSURE: 78 MMHG | SYSTOLIC BLOOD PRESSURE: 113 MMHG | OXYGEN SATURATION: 96 % | HEART RATE: 99 BPM | BODY MASS INDEX: 27.85 KG/M2

## 2025-04-21 DIAGNOSIS — C79.31 PRIMARY MALIGNANT NEOPLASM OF LUNG WITH METASTASIS TO BRAIN (MULTI): Primary | ICD-10-CM

## 2025-04-21 DIAGNOSIS — C79.31 MALIGNANT NEOPLASM METASTATIC TO BRAIN (MULTI): ICD-10-CM

## 2025-04-21 DIAGNOSIS — C34.90 PRIMARY MALIGNANT NEOPLASM OF LUNG WITH METASTASIS TO BRAIN (MULTI): Primary | ICD-10-CM

## 2025-04-21 PROCEDURE — 99215 OFFICE O/P EST HI 40 MIN: CPT | Performed by: STUDENT IN AN ORGANIZED HEALTH CARE EDUCATION/TRAINING PROGRAM

## 2025-04-21 ASSESSMENT — ENCOUNTER SYMPTOMS
DIAPHORESIS: 0
BLOOD IN STOOL: 0
HEMATOLOGIC/LYMPHATIC NEGATIVE: 1
ARTHRALGIAS: 0
WHEEZING: 0
HEMATURIA: 0
FATIGUE: 1
TROUBLE SWALLOWING: 0
BACK PAIN: 1
UNEXPECTED WEIGHT CHANGE: 0
DIFFICULTY URINATING: 0
EYE PROBLEMS: 1
LIGHT-HEADEDNESS: 0
NECK STIFFNESS: 0
LIGHT-HEADEDNESS: 1
CONSTITUTIONAL NEGATIVE: 1
EYES NEGATIVE: 1
PALPITATIONS: 0
CHEST TIGHTNESS: 0
NAUSEA: 0
NUMBNESS: 1
SPEECH DIFFICULTY: 1
DIZZINESS: 1
HEADACHES: 0
FEVER: 0
ABDOMINAL PAIN: 0
CHILLS: 0
DIARRHEA: 1
RESPIRATORY NEGATIVE: 1
CONFUSION: 1
DECREASED CONCENTRATION: 1
ENDOCRINE NEGATIVE: 1
MYALGIAS: 0
DEPRESSION: 1
FLANK PAIN: 0
ADENOPATHY: 0
SLEEP DISTURBANCE: 1
CONSTIPATION: 0
ARTHRALGIAS: 1
CONFUSION: 0
SEIZURES: 0
CARDIOVASCULAR NEGATIVE: 1
NERVOUS/ANXIOUS: 0
COUGH: 0
ABDOMINAL DISTENTION: 0
MYALGIAS: 1
VOMITING: 0
EXTREMITY WEAKNESS: 1
DIARRHEA: 0
NECK PAIN: 1
NECK PAIN: 0
SHORTNESS OF BREATH: 0
NERVOUS/ANXIOUS: 1
HEADACHES: 1
RECTAL PAIN: 0

## 2025-04-21 ASSESSMENT — PAIN SCALES - GENERAL: PAINLEVEL_OUTOF10: 5

## 2025-04-21 NOTE — PROGRESS NOTES
"Radiation Oncology Follow-Up    Patient Name:  Anitar Garcia  MRN:  [id number]  :  1965    Referring Provider: No ref. provider found  Primary Care Provider: Xiomara Harrell MD  Care Team: Patient Care Team:  Xiomara Harrell MD as PCP - General (Internal Medicine)  Xiomara Harrell MD as PCP - [facility] PCP    Date of Service: 2025     Diagnosis:   Metastatic ovarian/fallopian tube cancer s/p debulking in      Most recent radiation therapy:  The patient received whole brain radiation therapy (23 Gy/5 fx) from 23-23 which she tolerated well.     SUBJECTIVE  History of Present Illness: Note adapted from Karson Moreno's note.   58yoF c metastatic ovarian/fallopian tube cancer s/p debulking in . On maintenance niraparib. Extracranially controlled on 23 CT CAP. Developed imbalance, slurred speech, R facial droop over 3 wks. 23 brain MRI at [facility] = Multiple metastases (3.6 cm L cerebellar, 1 cm R cerebellar, 4.4 cm L cerebellar vermis, 4.5 cm R occipital, 2 cm L corpus callosum, 4.4 cm L medial posterior frontal, 2.6 cm R frontal, 1.6 cm L frontal). Symptoms improved on Dex 4q6 and Protonix. Mild R facial droop on 23 exam, with L dysmetria. She was seen by Radiation Oncology as an inpatient on 23, and elected to undergo palliative radiation treatment of her intracranial disease.  The patient received whole brain radiation therapy (23 Gy/5 fx) from 23-23 which was tolerated well.  Today the patient states that she's doing \"okay\", but is quite fatigued secondary to her systemic treatments.  Since her WBRT, the patient endorses better balance, and improvement of her slurred speech.  Continues to have some issues with fine motor control of her hands which has remained stable.  Patient to resume PT for balance and deconditioning.  Endorses that her headaches have improved greatly.  Continues to struggle with PO intake.  She states that she isn't really hungry, " but tries to bridge her calories with an occasional Ensure.  [place] [facility] [place]   Denies chills, fever, SOB or chest pain.  Denies seizures, dizziness, diplopia, hearing changes, vision changes or focal sensory/motor changes.  Denies abdominal pain, vomiting or diarrhea.  Does endorses nausea during her systemic treatments.  We discussed the importance of taking her anti emetic medications as a scheduled medication to help, consistently, manage the nausea.  Does endorse some constipation and states that stool softeners don't really help.  We discussed trailing Miralax and she was agreeable.  An MRI of the brain completed today looked relatively stable and told the patient that I'd reach back out to her once the final read is posted.      6/10/24 Interval Visit:   Today the patient is in clinic, accompanied by her sister, for a routine Radiation Oncology FU visit.  Today the patient states that she's doing somewhat better with her mobility/strength s/p PT.  Continues to have some imbalance/generalized weakness when standing which has been stable.  Continues to use a franco when ambulating distances.  Endorses a left occipital headache that radiates to her left neck and shoulder which as been stable.  She rates the pain as a 5/10 and has been managing with Tylenol.   Denies seizures, dizziness, vision changes, or new focal sensory/motor changes.  Continue to have slurred speech which has been stable.  Denies chills, fever, bony pain, SOB, or chest pain.  Denies abdominal pain, pelvic pain, nausea, vomiting or diarrhea.  Has intermittent constipation and manges with a stool softener.  Today we re-reviewed her MRI brain completed on 5/28/24.  Per the Radiology Read, there is again evidence of small satellite lesions along the superior  left cerebellar hemisphere denoted by arrows on the axial post gadolinium volumetric T1 weighted MRI images slice 150 of 256 and 147 of 256 as well as a small linear focus  of enhancement along the superior cerebellar vermis denoted by an arrow on axial post gadolinium volumetric T1 slice 145 of 256 which appear minimally more conspicuous when compared with the prior study dated 02/27/2024. The lesions demonstrate a component of bright signal on the precontrast T1 images which appears slightly more conspicuous when compared with 02/27/2024 raising the possibility of dystrophic calcification/mineralization and/or subacute blood products with a component of suspected minimal superimposed enhancement on the post gadolinium images. There is blooming dark signal on the gradient echo T2 images associated with these lesions suggesting associated blood products/hemosiderin deposition anterior dystrophic calcification/mineralization. The lesions are too small to be further characterized on the obtained advanced MRI imaging. Continued attention to these nonspecific foci on short term follow-up MRI imaging would be recommended.  We discussed the concern regarding the aforementioned satellite lesions and the recommendation of short interval (6-8 weeks) and patient was agreeable to the imaging.     Note: The 5/28/24 MRI brain was discussed with Ravindra Ribeiro MD and he suggested short interval imaging.     7/16/24 Interval Visit:   Today the patient is in clinic, accompanied by her daughter, for a routine Radiation Oncology FU visit and review of an MRI of the brain.  The patient states that she's doing well.   Her generalized weakness had improved a great deal with PT.  She had been walking her driveway to build strength and is now walking the neighborhood.  Her speech is also improved as well.  Denies seizures, dizziness, vision changes, or new focal sensory/motor changes.  Denies chills, fever, bony pain, SOB, or chest pain.  Denies abdominal pain, pelvic pain, nausea, vomiting or diarrhea.  Has intermittent constipation and manges with a stool softener.   We briefly discussed her MRI brain  "and the difficulty of interpretation give that she's had WBRT.   I suggested take her imaging to the 7/17/24 CNS Tumor Board for review.  The patient and daughter were agreeable with the plan.   I told them that I'd call the with recommendations late morning on 7/17/24.     7/17/24 Addendum:    The patients 7/16/24 MRI brain was taken to the 7/17/24 CNS Tumor Board for review.  The consensus was that the imaging is stable as compared to prior imaging.  The recommendation is to repeat an MRI brain with perfusion in 3 months.  This was discussed with the patient and daughter via phone call on 7/17/24. Denies seizures, dizziness, or new focal sensory/motor changes.  She does endorses visual changes.  She states that she sees occasionally sees shadows when looking at objects.  We discussed a referral to Dr. Mccloud (Neuro-ophthalmology) if her vision continues to worsen.  Denies chills, fever, bony pain, SOB, or chest pain.  Denies abdominal pain, pelvic pain, nausea, vomiting or diarrhea.      10/24/24 Interval Visit:   Today the patient is in clinic for a routine Radiation Oncology FU visit and reviw of an MRI brain completed today.  The patient states that she is doing \"okay\".  She continue with PT on a weekly basis for generalized weakness and imbalance.  She states that she continue to have some issue with STM that has been stable.  We discussed going on line and finding some neuro websites such as [employer] to help improve her memory and overall sharpness.  She continues to have some garble speech that his worse when she's tired.      Today the patient had an MRI of the brain with perfusion.  At the time of the visit,  there was no Radiology read posted.  I told the patient that I'd give her a call in the morning with the final results.     4/7/25 Interval Visit:   Today the patient is in clinic for a routine Radiation Oncology FU visit and review of an MRI brain completed today.  Overall, the patient states that " her neuro status has been pretty much stable.  She continues to have generalized weakness and balance issue which requires her to use a tripod cane.  She also continues to have some slowness of speech and occasional word finding that has been stable.  Denies seizures, or new focal sensory/motor deficits.  She does endorse an occasional headache, but nothing out of the ordinary.  She manages with OTC Tylenol.  She also endorses some dizziness when she tilts her head back.  Denies chills, fever, fatigue, SOB or chest pain.  Denies abdominal pain, nausea, vomiting, diarrhea or constipation.      4/21/25:  Patient is doing well. She continues to have speech difficulty, memory problems and balance issues. Denies any headaches, nausea or vomiting. She states that she is currently on adjuvant immunotherapy receiving every 3 weeks. She had an inadvertent break in immunotherapy due to insurance issues between January and April.       Treatment Rendered:   Whole Brain RT - 23 Gy in 5 fraction completing treatment on 8/2023      Review of Systems:   Review of Systems   Constitutional:  Positive for fatigue. Negative for chills, diaphoresis, fever and unexpected weight change.   HENT:   Negative for hearing loss, lump/mass, tinnitus and trouble swallowing.    Eyes:  Positive for eye problems (age related.).   Respiratory:  Negative for chest tightness, cough, shortness of breath and wheezing.    Cardiovascular:  Negative for chest pain and palpitations.   Gastrointestinal:  Negative for abdominal distention, abdominal pain, constipation, diarrhea, nausea and vomiting.   Genitourinary:  Negative for difficulty urinating and hematuria.    Musculoskeletal:  Positive for back pain (upper right back/shoulder.), gait problem (Left occipital), myalgias and neck pain. Negative for arthralgias and flank pain.   Neurological:  Positive for dizziness, extremity weakness (Generalized weakness.), gait problem (Left occipital),  "light-headedness (with strenuous activity.) and speech difficulty. Negative for headaches and seizures.   Hematological:  Negative for adenopathy.   Psychiatric/Behavioral:  Positive for [place] [facility] [place] and sleep disturbance. Negative for confusion. The patient is not nervous/anxious.      The patient's current pain level was assessed.  They report currently having a pain of 3 out of 10 on top of her arms up to the shoulders.   Patient manages with OTC meds.     Performance Status:   The Karnofsky performance scale today is 70, Cares for self; unable to carry on normal activity or to do active work (ECOG equivalent 1).      OBJECTIVE  Vital Signs:      7/16/2024    10:03 AM 10/24/2024     3:08 PM 10/24/2024     4:23 PM 12/24/2024     5:40 PM 4/7/2025     3:20 PM 4/7/2025     4:31 PM 4/21/2025     8:40 AM   Vitals   Systolic 117  123   114 113   Diastolic 81  85   78 78   BP Location      Right arm Right arm   Heart Rate 83  98   104 99   Temp 36.2 °C (97.2 °F)  36 °C (96.8 °F)   36.5 °C (97.7 °F) 36.1 °C (97 °F)   Resp 18  18   18 18   Height 1.676 m (5' 6\") 1.702 m (5' 7\")     1.637 m (5' 4.45\")    Weight (lb) 153.2 160 164 171 166 165.8 163.14   BMI 24.73 kg/m2 25.06 kg/m2 25.69 kg/m2 26.78 kg/m2 26 kg/m2 25.97 kg/m2 27.61 kg/m2   BSA (m2) 1.8 m2 1.85 m2 1.88 m2 1.92 m2 1.89 m2 1.89 m2 1.83 m2       Significant value      Physical Exam:  Physical Exam  Constitutional:       General: She is not in acute distress.     Appearance: Normal appearance. She is normal weight. She is not ill-appearing, toxic-appearing or diaphoretic.   HENT:      Head: Normocephalic and atraumatic.      Comments: Thinning hair.      Nose: Nose normal. No congestion or rhinorrhea.      Mouth/Throat:      Mouth: Mucous membranes are moist.   Eyes:      General: Lids are normal. Gaze aligned appropriately.      Extraocular Movements: Extraocular movements intact.      Pupils: Pupils are equal, round, and reactive to light. "   Neck:      Thyroid: No thyroid mass or thyroid tenderness.   Cardiovascular:      Rate and Rhythm: Normal rate and regular rhythm.      Pulses: Normal pulses.      Heart sounds: Normal heart sounds. No murmur heard.  Pulmonary:      Effort: Pulmonary effort is normal. No tachypnea or respiratory distress.      Breath sounds: Normal breath sounds. No wheezing or rhonchi.   Abdominal:      General: Abdomen is flat. Bowel sounds are normal. There is no distension.      Palpations: Abdomen is soft. There is no mass.      Tenderness: There is no abdominal tenderness. There is no guarding or rebound.   Musculoskeletal:         General: No swelling, tenderness, deformity or signs of injury. Normal range of motion.      Cervical back: Full passive range of motion without pain, normal range of motion and neck supple. No rigidity or tenderness. No pain with movement. Normal range of motion.      Right lower leg: No edema.      Left lower leg: No edema.   Lymphadenopathy:      Head:      Right side of head: No submental, submandibular, tonsillar, preauricular, posterior auricular or occipital adenopathy.      Left side of head: No submental, submandibular, tonsillar, preauricular, posterior auricular or occipital adenopathy.      Cervical:      Right cervical: No superficial, deep or posterior cervical adenopathy.     Left cervical: No superficial, deep or posterior cervical adenopathy.   Skin:     General: Skin is warm and dry.      Capillary Refill: Capillary refill takes less than 2 seconds.      Coloration: Skin is not jaundiced or pale.      Findings: No erythema, lesion or rash.   Neurological:      General: No focal deficit present.      Mental Status: She is alert and oriented to person, place, and time.      Cranial Nerves: No cranial nerve deficit.      Sensory: No sensory deficit.      Motor: Weakness present. No seizure activity or pronator drift.      Coordination: Romberg sign positive. Coordination abnormal.  Finger-Nose-Finger Test and Heel to Agarwal Test normal.      Gait: Gait abnormal.      Comments: Slurred speech.    Psychiatric:         Attention and Perception: Attention normal.         Mood and Affect: Mood normal.         Behavior: Behavior normal. Behavior is cooperative.        MR BRAIN W AND WO IV CONTRAST; 2/27/2024 3:27 pm  IMPRESSION:  Status post suboccipital craniectomy with surgical mesh. There is  similar appearance of the resection cavity as well as extra-axial  fluid collections superficial to and surrounding the craniectomy.  Several of the previously noted lesions are stable to less pronounced  in comparison to previous MRI dated 11/27/2023, as described above.  There is a punctate new or more conspicuous lesion centered slightly  to the left of midline in the cerebellar vermis, however. Recommend  continued attention on follow-up.    MR BRAIN TUMOR PERFUSION PROTOCOL W AND WO IV CONTRAST;  5/28/2024  11:05 am   IMPRESSION:  Postoperative changes are again identified compatible with a previous  suboccipital craniectomy with surgical mesh overlying the craniectomy  site.      There is again evidence of a surgical resection cavity deep to the  craniotomy site along the posterior left cerebellar hemisphere. A  small amount of bright signal on the precontrast T1 images again  noted within/along the margins of the surgical resection cavity  suggesting a small amount of dystrophic calcification/mineralization.  The gradient echo T2 images again demonstrate blooming dark signal  surrounding the margins of the surgical resection cavity compatible  with hemosiderin deposition anterior dystrophic  calcification/mineralization. The post gadolinium images again  demonstrate mild superimposed enhancement along the margins of the  surgical resection cavity similar in appearance when compared with  the prior examination dated 02/27/2024. The enhancement along the  margins of the surgical resection cavity is too  small to be further  characterized on the obtained advanced MRI imaging. There is again  evidence of surrounding nonenhancing bright signal on the FLAIR and  T2 images within the left cerebellar hemisphere minimally more  pronounced when compared with 02/27/2024 which while nonspecific  raises the possibility of mild interval increased surrounding edema  and/or gliosis.      There is again evidence of small satellite lesions along the superior  left cerebellar hemisphere denoted by arrows on the axial post  gadolinium volumetric T1 weighted MRI images slice 150 of 256 and 147  of 256 as well as a small linear focus of enhancement along the  superior cerebellar vermis denoted by an arrow on axial post  gadolinium volumetric T1 slice 145 of 256 which appear minimally more  conspicuous when compared with the prior study dated 02/27/2024. The  lesions demonstrate a component of bright signal on the precontrast  T1 images which appears slightly more conspicuous when compared with  02/27/2024 raising the possibility of dystrophic  calcification/mineralization and/or subacute blood products with a  component of suspected minimal superimposed enhancement on the post  gadolinium images. There is blooming dark signal on the gradient echo  T2 images associated with these lesions suggesting associated blood  products/hemosiderin deposition anterior dystrophic  calcification/mineralization. The lesions are too small to be further  characterized on the obtained advanced MRI imaging. Continued  attention to these nonspecific foci on short term follow-up MRI  imaging would be recommended.      There is again evidence of a mixed signal hemorrhagic and/or  partially calcified lesion centered within the left corona radiata  demonstrating mildly more conspicuous rim enhancement on the current  study when compared with 02/27/2024. The rim enhancing lesion  measures approximately 12 mm x 10 mm in transaxial dimensions as seen  on axial  post gadolinium volumetric T1 slice 89 of 256 by 17 mm in  craniocaudal dimension as seen on coronal post gadolinium volumetric  T1 slice 94 of 200. The lesion again demonstrates a component of  blooming dark signal on the gradient echo T2 images suggesting  associated blood products/hemosiderin deposition anterior dystrophic  calcification/mineralization. The blooming dark signal on the  gradient echo T2 images renders further evaluation on the obtained  advanced MRI perfusion data suspect. That being said, no significant  elevated corrected rCBV values are noted in the region of the this  rim enhancing lesion.      There is again evidence of an approximately 7 mm lesion noted along  the right frontal lobe convexity as seen on axial post gadolinium  volumetric T1 slice 63 of 256 similar when compared with the prior  study dated 02/27/2024. There is again evidence of a small 5 mm  lesion noted along the left frontal lobe convexity similar in  appearance when compared with the prior study dated 02/27/2024. There  is again evidence of blooming dark signal on the gradient echo T2  images associated with the lesions suggesting associated blood  products/hemosiderin deposition anterior dystrophic  calcification/mineralization.      There is again evidence of a punctate enhancing lesion along the  lateral right cerebellar hemisphere as seen on axial post gadolinium  T1 slice 173 of 256 which appears similar when compared with the  prior study dated 02/27/2024.      There is a punctate enhancing lesion noted along the lateral right  cerebellar hemisphere as seen on axial post gadolinium volumetric T1  slice 179 of 256 which was not clearly visualized on the prior study.  There is a punctate enhancing lesion noted along the lateral right  frontal lobe as seen on axial post gadolinium volumetric T1 slice 96  of 256 which was not well defined on the on the prior study dated  02/27/2024. Continued attention to these punctate  "lesions on  follow-up imaging is recommended.      The above findings are superimposed upon moderate brain parenchymal  volume loss.      There is again evidence of scattered as well as more ill-defined  patchy and confluent white matter changes within the cerebral  hemispheres bilaterally with the more confluence/ill-defined white  matter changes noted to be more pronounced when compared with the  prior study dated 02/27/2024 and while nonspecific may be post  therapy related with the possibility of sequelae of more remote  small-vessel ischemic changes not excluded.    MR BRAIN TUMOR PERFUSION PROTOCOL W AND WO IV CONTRAST;  7/16/2024  9:47 am  IMPRESSION:  Stable MR appearance of the cerebral and cerebellar lesions and  postoperative/posttreatment changes. Perfusion/permeability imaging  is not useful in this case secondary to insufficient measurable  enhancing lesions and adjacent susceptibility artifact.       OBJECTIVE:   Vital Signs:        7/16/2024    10:03 AM 10/24/2024     3:08 PM 10/24/2024     4:23 PM 12/24/2024     5:40 PM 4/7/2025     3:20 PM 4/7/2025     4:31 PM 4/21/2025     8:40 AM   Vitals   Systolic 117  123   114 113   Diastolic 81  85   78 78   BP Location      Right arm Right arm   Heart Rate 83  98   104 99   Temp 36.2 °C (97.2 °F)  36 °C (96.8 °F)   36.5 °C (97.7 °F) 36.1 °C (97 °F)   Resp 18  18   18 18   Height 1.676 m (5' 6\") 1.702 m (5' 7\")     1.637 m (5' 4.45\")    Weight (lb) 153.2 160 164 171 166 165.8 163.14   BMI 24.73 kg/m2 25.06 kg/m2 25.69 kg/m2 26.78 kg/m2 26 kg/m2 25.97 kg/m2 27.61 kg/m2   BSA (m2) 1.8 m2 1.85 m2 1.88 m2 1.92 m2 1.89 m2 1.89 m2 1.83 m2       Significant value           Assessment / Plan:   59 yoF c metastatic ovarian/fallopian tube cancer s/p debulking in 2020. On maintenance niraparib. Extracranially controlled on 8/11/23 CT CAP. Developed imbalance, slurred speech, R facial droop over 3 wks. 8/11/23 brain MRI at [facility] = Multiple metastases (3.6 cm " L cerebellar, 1 cm R cerebellar, 4.4 cm L cerebellar vermis, 4.5 cm R occipital, 2 cm L corpus callosum, 4.4 cm L medial posterior frontal, 2.6 cm R frontal, 1.6 cm L frontal). Symptoms improved on Dex 4q6 and Protonix. Mild R facial droop on 8/14/23 exam, with L dysmetria. She was seen by Radiation Oncology as an inpatient on 8/14/23, and elected to undergo palliative radiation treatment of her intracranial disease.  The patient received whole brain radiation therapy (23 Gy/5 fx) from 8/22/23-8/28/23 which was tolerated well.  All questions/concerns were addressed to the satisfaction of the patient.     MRI brain is concerning for progressing brain metastases in the right cerebellum and new lesion in the right frontal lobe.We discussed Linac SRS vs Gamma knife SRS. Patient prefers the gamma knife SRS for the travel reasons. We discussed the risks, benefits and alternatives of treatment.     The potential side effects of Gamma Knife radiosurgery were discussed including short-term adverse effects associated with stereotactic frame placement such as headache following frame removal, bleeding or infection at the sites of pin insertion and a few days of periorbital swelling. Adverse effects specific to radiosurgery include short term sequelae such as fatigue and a small risk of alopecia approximately 3 weeks after treatment which would likely regrow within 3 months. Long-term risks associated with radiosurgery include fatigue, and treatment-associated brain edema and/or radionecrosis potentially causing headaches or other neurological symptoms requiring corticosteroids, anti-angiogenic agents, or even surgical intervention to control. We also discussed that there is a risk of additional brain metastases being found on the day of Gamma Knife treatment; in this scenario, additional brain lesions may be treated, or if the additional disease is extensive, the procedure may need to be aborted and  alternative treatments may  be recommended.     PLAN:      Gamma Knife SRS in 2-3 weeks In between the immunotherapy.     Please reach out with any questions or concerns.     NCCN Guidelines were applicable to guide this patients treatment plan.  Lyndon Coker MD, MS

## 2025-04-21 NOTE — ADDENDUM NOTE
Encounter addended by: Chuyita Lemus RN on: 4/21/2025 10:47 AM   Actions taken: Patient Education assessment filed, Patient Education title added, Patient Education documented on

## 2025-04-21 NOTE — ADDENDUM NOTE
Encounter addended by: Lyndon Coker MD, MS on: 4/21/2025 11:20 AM   Actions taken: Order list changed, Diagnosis association updated

## 2025-04-21 NOTE — PROGRESS NOTES
"Radiation Oncology Nursing Note    Prior Radiotherapy:  Yes, describe: brain RT in 8/2023; 5fx 23 Gy  No radiation treatments to show. (Treatments may have been administered in another system.)     Current Systemic Treatment:  No     Presence of Pacemaker or ICD:  No    History of Autoimmune or Connective Tissue Disorders:  No    Pain: The patient's current pain level was assessed.  They report currently having a pain of 0 out of 10.  They feel their pain is under control without the use of pain medications.    Review of Systems:  Review of Systems   Constitutional: Negative.    HENT:  Negative.     Eyes: Negative.    Respiratory: Negative.     Cardiovascular: Negative.    Gastrointestinal:  Positive for diarrhea. Negative for abdominal distention, abdominal pain, blood in stool, constipation, nausea, rectal pain and vomiting.   Endocrine: Negative.    Genitourinary: Negative.     Musculoskeletal:  Positive for arthralgias, back pain (upper right) and gait problem (using cane). Negative for flank pain, myalgias, neck pain and neck stiffness.   Skin: Negative.    Neurological:  Positive for dizziness (upon standing), extremity weakness, gait problem (using cane), headaches (back of head), numbness (right foot at times) and speech difficulty. Negative for light-headedness and seizures.   Hematological: Negative.    Psychiatric/Behavioral:  Positive for confusion (gets things \"mixed up\"), decreased concentration (hard to read at times), depression and sleep disturbance (trouble falling asleep and staying asleep). Negative for suicidal ideas. The patient is nervous/anxious.        "

## 2025-04-29 ENCOUNTER — HOSPITAL ENCOUNTER (OUTPATIENT)
Dept: RADIOLOGY | Facility: HOSPITAL | Age: 60
Discharge: HOME | End: 2025-04-29
Payer: COMMERCIAL

## 2025-04-29 ENCOUNTER — DOCUMENTATION (OUTPATIENT)
Dept: NEUROSURGERY | Facility: HOSPITAL | Age: 60
End: 2025-04-29
Payer: COMMERCIAL

## 2025-04-29 ENCOUNTER — HOSPITAL ENCOUNTER (OUTPATIENT)
Dept: RADIATION ONCOLOGY | Facility: HOSPITAL | Age: 60
Setting detail: RADIATION/ONCOLOGY SERIES
Discharge: HOME | End: 2025-04-29
Payer: COMMERCIAL

## 2025-04-29 VITALS
OXYGEN SATURATION: 97 % | DIASTOLIC BLOOD PRESSURE: 76 MMHG | BODY MASS INDEX: 27.66 KG/M2 | TEMPERATURE: 97.5 F | WEIGHT: 162 LBS | RESPIRATION RATE: 16 BRPM | HEIGHT: 64 IN | HEART RATE: 88 BPM | SYSTOLIC BLOOD PRESSURE: 135 MMHG

## 2025-04-29 VITALS
SYSTOLIC BLOOD PRESSURE: 110 MMHG | DIASTOLIC BLOOD PRESSURE: 63 MMHG | RESPIRATION RATE: 16 BRPM | HEART RATE: 87 BPM | OXYGEN SATURATION: 94 %

## 2025-04-29 DIAGNOSIS — C34.90 PRIMARY MALIGNANT NEOPLASM OF LUNG WITH METASTASIS TO BRAIN (MULTI): Primary | ICD-10-CM

## 2025-04-29 DIAGNOSIS — C79.31 MALIGNANT NEOPLASM METASTATIC TO BRAIN (MULTI): ICD-10-CM

## 2025-04-29 DIAGNOSIS — C79.31 PRIMARY MALIGNANT NEOPLASM OF LUNG WITH METASTASIS TO BRAIN (MULTI): Primary | ICD-10-CM

## 2025-04-29 DIAGNOSIS — Z51.0 ENCOUNTER FOR ANTINEOPLASTIC RADIATION THERAPY: ICD-10-CM

## 2025-04-29 LAB
RAD ONC MSQ ACTUAL FRACTIONS DELIVERED: 1
RAD ONC MSQ ACTUAL SESSION DELIVERED DOSE: 1800 CGRAY
RAD ONC MSQ ACTUAL SESSION DELIVERED DOSE: 2000 CGRAY
RAD ONC MSQ ACTUAL SESSION DELIVERED DOSE: 2000 CGRAY
RAD ONC MSQ ACTUAL TOTAL DOSE: 1800 CGRAY
RAD ONC MSQ ACTUAL TOTAL DOSE: 2000 CGRAY
RAD ONC MSQ ACTUAL TOTAL DOSE: 2000 CGRAY
RAD ONC MSQ ELAPSED DAYS: 0
RAD ONC MSQ LAST DATE: NORMAL
RAD ONC MSQ PRESCRIBED FRACTIONAL DOSE: 1800 CGRAY
RAD ONC MSQ PRESCRIBED FRACTIONAL DOSE: 2000 CGRAY
RAD ONC MSQ PRESCRIBED FRACTIONAL DOSE: 2000 CGRAY
RAD ONC MSQ PRESCRIBED NUMBER OF FRACTIONS: 1
RAD ONC MSQ PRESCRIBED TECHNIQUE: NORMAL
RAD ONC MSQ PRESCRIBED TOTAL DOSE: 1800 CGRAY
RAD ONC MSQ PRESCRIBED TOTAL DOSE: 2000 CGRAY
RAD ONC MSQ PRESCRIBED TOTAL DOSE: 2000 CGRAY
RAD ONC MSQ START DATE: NORMAL
RAD ONC MSQ TREATMENT COURSE NUMBER: 2
RAD ONC MSQ TREATMENT SITE: NORMAL

## 2025-04-29 PROCEDURE — 61796 SRS CRANIAL LESION SIMPLE: CPT | Performed by: NEUROLOGICAL SURGERY

## 2025-04-29 PROCEDURE — 77370 RADIATION PHYSICS CONSULT: CPT | Mod: XE

## 2025-04-29 PROCEDURE — 61800 APPLY SRS HEADFRAME ADD-ON: CPT | Performed by: NEUROLOGICAL SURGERY

## 2025-04-29 PROCEDURE — 70553 MRI BRAIN STEM W/O & W/DYE: CPT | Performed by: RADIOLOGY

## 2025-04-29 PROCEDURE — 77371 SRS MULTISOURCE: CPT

## 2025-04-29 PROCEDURE — 2500000004 HC RX 250 GENERAL PHARMACY W/ HCPCS (ALT 636 FOR OP/ED): Performed by: NEUROLOGICAL SURGERY

## 2025-04-29 PROCEDURE — 2500000001 HC RX 250 WO HCPCS SELF ADMINISTERED DRUGS (ALT 637 FOR MEDICARE OP): Performed by: NEUROLOGICAL SURGERY

## 2025-04-29 PROCEDURE — 77300 RADIATION THERAPY DOSE PLAN: CPT

## 2025-04-29 PROCEDURE — 2500000004 HC RX 250 GENERAL PHARMACY W/ HCPCS (ALT 636 FOR OP/ED): Performed by: STUDENT IN AN ORGANIZED HEALTH CARE EDUCATION/TRAINING PROGRAM

## 2025-04-29 PROCEDURE — 61797 SRS CRAN LES SIMPLE ADDL: CPT | Performed by: NEUROLOGICAL SURGERY

## 2025-04-29 PROCEDURE — 77334 RADIATION TREATMENT AID(S): CPT

## 2025-04-29 PROCEDURE — 77295 3-D RADIOTHERAPY PLAN: CPT

## 2025-04-29 RX ORDER — MIDAZOLAM HYDROCHLORIDE 1 MG/ML
0.5 INJECTION, SOLUTION INTRAMUSCULAR; INTRAVENOUS ONCE
Status: CANCELLED | OUTPATIENT
Start: 2025-04-29 | End: 2025-04-29

## 2025-04-29 RX ORDER — GADOTERATE MEGLUMINE 376.9 MG/ML
30 INJECTION INTRAVENOUS
Status: COMPLETED | OUTPATIENT
Start: 2025-04-29 | End: 2025-04-29

## 2025-04-29 RX ORDER — IBUPROFEN 200 MG
400 TABLET ORAL AS NEEDED
Status: CANCELLED | OUTPATIENT
Start: 2025-04-29

## 2025-04-29 RX ORDER — HYDROMORPHONE HYDROCHLORIDE 1 MG/ML
0.4 INJECTION, SOLUTION INTRAMUSCULAR; INTRAVENOUS; SUBCUTANEOUS ONCE
Status: CANCELLED | OUTPATIENT
Start: 2025-04-29 | End: 2025-04-29

## 2025-04-29 RX ORDER — HYDROMORPHONE HYDROCHLORIDE 1 MG/ML
0.4 INJECTION, SOLUTION INTRAMUSCULAR; INTRAVENOUS; SUBCUTANEOUS ONCE
Status: COMPLETED | OUTPATIENT
Start: 2025-04-29 | End: 2025-04-29

## 2025-04-29 RX ORDER — LIDOCAINE AND PRILOCAINE 25; 25 MG/G; MG/G
1 CREAM TOPICAL ONCE
Status: CANCELLED | OUTPATIENT
Start: 2025-04-29 | End: 2025-04-29

## 2025-04-29 RX ORDER — MIDAZOLAM HYDROCHLORIDE 1 MG/ML
0.5 INJECTION, SOLUTION INTRAMUSCULAR; INTRAVENOUS ONCE
Status: COMPLETED | OUTPATIENT
Start: 2025-04-29 | End: 2025-04-29

## 2025-04-29 RX ORDER — HYDROMORPHONE HYDROCHLORIDE 1 MG/ML
0.6 INJECTION, SOLUTION INTRAMUSCULAR; INTRAVENOUS; SUBCUTANEOUS ONCE
Status: CANCELLED | OUTPATIENT
Start: 2025-04-29 | End: 2025-04-29

## 2025-04-29 RX ORDER — ONDANSETRON HYDROCHLORIDE 2 MG/ML
4 INJECTION, SOLUTION INTRAVENOUS EVERY 4 HOURS PRN
Status: DISCONTINUED | OUTPATIENT
Start: 2025-04-29 | End: 2025-04-30 | Stop reason: HOSPADM

## 2025-04-29 RX ORDER — ONDANSETRON HYDROCHLORIDE 2 MG/ML
4 INJECTION, SOLUTION INTRAVENOUS EVERY 4 HOURS PRN
Status: CANCELLED | OUTPATIENT
Start: 2025-04-29

## 2025-04-29 RX ORDER — LIDOCAINE AND PRILOCAINE 25; 25 MG/G; MG/G
1 CREAM TOPICAL ONCE
Status: COMPLETED | OUTPATIENT
Start: 2025-04-29 | End: 2025-04-29

## 2025-04-29 RX ORDER — IBUPROFEN 200 MG
400 TABLET ORAL AS NEEDED
Status: DISCONTINUED | OUTPATIENT
Start: 2025-04-29 | End: 2025-04-30 | Stop reason: HOSPADM

## 2025-04-29 RX ORDER — ACETAMINOPHEN 325 MG/1
650 TABLET ORAL EVERY 4 HOURS PRN
Status: DISCONTINUED | OUTPATIENT
Start: 2025-04-29 | End: 2025-04-30 | Stop reason: HOSPADM

## 2025-04-29 RX ORDER — ACETAMINOPHEN 325 MG/1
650 TABLET ORAL EVERY 4 HOURS PRN
Status: CANCELLED | OUTPATIENT
Start: 2025-04-29

## 2025-04-29 RX ORDER — HYDROMORPHONE HYDROCHLORIDE 1 MG/ML
0.6 INJECTION, SOLUTION INTRAMUSCULAR; INTRAVENOUS; SUBCUTANEOUS ONCE
Status: COMPLETED | OUTPATIENT
Start: 2025-04-29 | End: 2025-04-29

## 2025-04-29 RX ADMIN — ACETAMINOPHEN 650 MG: 325 TABLET ORAL at 14:06

## 2025-04-29 RX ADMIN — MIDAZOLAM 0.5 MG: 1 INJECTION INTRAMUSCULAR; INTRAVENOUS at 11:30

## 2025-04-29 RX ADMIN — HYDROMORPHONE HYDROCHLORIDE 0.6 MG: 1 INJECTION, SOLUTION INTRAMUSCULAR; INTRAVENOUS; SUBCUTANEOUS at 11:30

## 2025-04-29 RX ADMIN — GADOTERATE MEGLUMINE 29 ML: 376.9 INJECTION INTRAVENOUS at 09:00

## 2025-04-29 RX ADMIN — HYDROMORPHONE HYDROCHLORIDE 0.4 MG: 1 INJECTION, SOLUTION INTRAMUSCULAR; INTRAVENOUS; SUBCUTANEOUS at 08:00

## 2025-04-29 RX ADMIN — MIDAZOLAM 0.5 MG: 1 INJECTION INTRAMUSCULAR; INTRAVENOUS at 08:00

## 2025-04-29 RX ADMIN — LIDOCAINE AND PRILOCAINE 10 APPLICATION: 25; 25 CREAM TOPICAL at 07:00

## 2025-04-29 ASSESSMENT — PAIN SCALES - GENERAL
PAINLEVEL_OUTOF10: 4
PAINLEVEL_OUTOF10: 4

## 2025-04-29 NOTE — PROGRESS NOTES
Patient ID: Anitra Garcia is a 59 y.o. female.    Procedures    Date of Procedure:    4/29/2025     Procedure  Placement of stereotactic head frame  Stereotactic Radiosurgery of 3 Intracranial lesions     Primary Surgeon:   Lissa Yeh MD     Radiation Oncologist:   Lyndon Coker MD     Preoperative Diagnosis:   Brain Metastasis    Postoperative Diagnosis:   Brain Metastasis    History Of Present Illness  Anitra Garcia is a 59 y.o. female with a history of metastatic ovarian/fallopian tube cancer. Recent imaging showed intracranial lesions. The patient is dispositioned for stereotactic radiosurgery. The risk and benefits of radiosurgery were explained to the patient in detail, including risk of bleeding, seizure, radiation necrosis and frame placement. The patient is amenable to proceed.     Anesthesia:   Local anesthesia injected both superficially and deep.     Procedure in Detail:   The patient was take to the preoperative suite where the gamma knife frame was to be placed. The [place] gamma knife frame was placed over the patient's head. Two frontal and two occipital sites were marked to approximately where the pins would be placed. EMLA cream was placed on the frontal sites, then wiped off. The sites were cleansed with alcohol. Lidocaine was injected superficially and deep. The [place] gamma knife was placed over the patient's head again. Two frontal and 2 occipital pins were placed. All pins were tightened with a hex wrench to appropriate torque of 0.45 nM. After placement of all the pins, we made sure the frame could not be moved and clear phantom was placed to ensure fit. The patient tolerated the procedure well.     Following placement of frame, the patient was taken to the MRI scanner where intravenous contrast dye was administered and 3 intracranial lesions were identified for treatment. The patient was taken back to the gamma knife suite where radiosurgery was to be carried out. The  treatment and dose plans were created by the radiation oncologist. The target volume of the lesion was outlined, dose prescribed and radiosurgical planning was carried out. The gamma knife plan software was used to set up radiation and  measures were performed without difficulty. The plan was signed and approved by me, the radiation oncologist, and the medical physicist. The patient was then taken into the gamma knife suite and the patient's head frame was secured to the table. The patient received radiosurgery without difficulty, after which the frame was removed and the patient was discharged the same day. Please see Radiation Oncology note by Dr. Coker for details of dosages.     Estimated Blood Loss:   Minimal     Complications:   None     Disposition:   Stable to recovery     Sites Treated:   Brain lesion(s) treated:   RF84 target received 20 Gy at the 72 % isodose line in 2 shot(s). Total volume: 0.018 cm3. 99.6 % of the tumor received the prescription isodose of 20 Gy at the 72 % isodose line.      target received 20 Gy at the 64 % isodose line in 3 shot(s). Total volume: 0.197 cm3. 99.5 % of the tumor received the prescription isodose of 20 Gy at the 64 % isodose line.     [id number] target received 18 Gy at the 55 % isodose line in 25 shot(s). Total volume: 1.189 cm3. 99.7% of the tumor received the prescription isodose of 18 Gy at the 55 % isodose line.

## 2025-04-30 NOTE — PROGRESS NOTES
Radiation Oncology On Treatment Visit    Patient Name:  Anitra Garcia  MRN:  [id number]  :  1965    Referring Provider: No ref. 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       OBJECTIVE:   Vital Signs:  /63   Pulse 87   Resp 16   SpO2 94% Comment: RA    Other Pertinent Findings:     Toxicity Assessment          2025    14:15   Toxicity Assessment   Adverse Events Reviewed (WDL) No (Exceptions to WDL)   Treatment Site Brain   Anorexia Grade 1   Anxiety Grade 1   Dehydration Grade 0   Depression Grade 0   Dermatitis Radiation Grade 0   Diarrhea Grade 0   Fatigue Grade 1   Fibrosis Deep Connective Tissue Grade 0   Fracture Grade 0   Nausea Grade 0   Pain Grade 1       Right shoulder; manageable   Treatment Related Secondary Malignancy Grade 0   Tumor Pain Grade 0   Vomiting Grade 0   Hearing Impaired Grade 0   Middle Ear Inflammation Grade 0   Endocrine Disorders - Other, Specify Grade 0   Blurred Vision Grade 0   Dry Eye Grade 0   Eye Pain Grade 0   Optic Nerve Disorder Grade 0   Retinopathy Grade 0   Eye Disorders - Other, Specify Grade 2       diplopia   Central  Nervous System Necrosis Grade 0   Cognitive Disturbance Grade 1       slurred speech/word finding   Edema Cerebral Grade 0   Headache Grade 1       encircling forehead to back of head   Ischemia Cerebrovascular Grade 0   Memory Impairment Grade 0   Seizure Grade 0        Assessment / Plan:  The patient is tolerating radiation therapy as anticipated.  Continue per current treatment plan.

## 2025-04-30 NOTE — RADIATION COMPLETION NOTES
Radiation Oncology Completion Summary     ID: [id number]      Anitra Garcia   presented to the Gamma knife suite this morning and the stereotactic frame was placed by Dr. Yeh . The patient was transferred to diagnostic radiology for an radiation treatment planning MRI brain w/wo contrast scan.    This imaging information was transferred back to the Gamma Plan workstation.  The MRI images were carefully reviewed and the multiple metastases  was carefully contoured. A highly conformal plan was developed. Careful consideration was given to the dose fall-off around normal brain tissue.  The plan was reviewed and approved by Belinda Serrano(physicist) and by me. The patient was then brought to the Gamma knife suite and the treatment was delivered.      Technical Summary:  Region(s) Treated: Multiple metastases  Radiation Dose/Technique: Gamma Knife Perfexion   18-20 Gy to 55-72 % isodose line  Total of 3 target irradiated with total number of shots: 30.   Total beam time 122 minutes.     Radiation Treatment Dates: 04/30/25   Total Elapsed Days: 1     Clinical Summary: The patient tolerated the procedure well with no acute complications, and was discharged home.     Followup/Disposition:  The patient will reachout to our office if he has any new acute side effects of Radiation Therapy. We will schedule a MRI of the brain with and without contrast to be done every 3 months with follow-up at that time for surveillance.      Thank you for allowing me to participate in the care of Anitra Coker MD MS  , Department of Radiation Oncology  [facility] [place], [facility]

## 2025-07-08 ENCOUNTER — APPOINTMENT (OUTPATIENT)
Dept: NEUROLOGY | Facility: CLINIC | Age: 60
End: 2025-07-08
Payer: COMMERCIAL

## 2025-07-31 ENCOUNTER — TELEPHONE (OUTPATIENT)
Dept: RADIATION ONCOLOGY | Facility: CLINIC | Age: 60
End: 2025-07-31
Payer: COMMERCIAL

## 2025-07-31 NOTE — TELEPHONE ENCOUNTER
Called pt to remind of appointment on 08/44/25 at 4:00. Pt's phone went to voicemail left number if needs to reschedule.

## 2025-08-01 ASSESSMENT — ENCOUNTER SYMPTOMS
CONSTIPATION: 0
CHILLS: 0
EYE PROBLEMS: 1
COUGH: 0
WHEEZING: 0
MYALGIAS: 1
TROUBLE SWALLOWING: 0
BACK PAIN: 1
FLANK PAIN: 0
DIARRHEA: 0
EXTREMITY WEAKNESS: 1
HEMATURIA: 0
DIFFICULTY URINATING: 0
DIZZINESS: 1
NAUSEA: 0
HEADACHES: 0
SHORTNESS OF BREATH: 0
NECK PAIN: 1
UNEXPECTED WEIGHT CHANGE: 0
PALPITATIONS: 0
SLEEP DISTURBANCE: 1
SPEECH DIFFICULTY: 1
DIAPHORESIS: 0
CONFUSION: 0
DEPRESSION: 1
ABDOMINAL PAIN: 0
SEIZURES: 0
NERVOUS/ANXIOUS: 0
LIGHT-HEADEDNESS: 1
ADENOPATHY: 0
ARTHRALGIAS: 0
FATIGUE: 1
CHEST TIGHTNESS: 0
FEVER: 0
ABDOMINAL DISTENTION: 0
VOMITING: 0

## 2025-08-01 NOTE — PROGRESS NOTES
"Radiation Oncology Follow-Up    Patient Name:  Anitra Garcia  MRN:  [id number]  :  1965    Referring Provider: Lyndon Coker MD, *  Primary Care Provider: Xiomara Harrell MD  Care Team: Patient Care Team:  Xiomara Harrell MD as PCP - General (Internal Medicine)    Date of Service: 2025     Diagnosis:   Metastatic ovarian/fallopian tube cancer s/p debulking in      Most recent radiation therapy:  The patient received whole brain radiation therapy (23 Gy/5 fx) from 23-23 which she tolerated well.     SUBJECTIVE  History of Present Illness:   58yoF c metastatic ovarian/fallopian tube cancer s/p debulking in . On maintenance niraparib. Extracranially controlled on 23 CT CAP. Developed imbalance, slurred speech, R facial droop over 3 wks. 23 brain MRI at [facility] = Multiple metastases (3.6 cm L cerebellar, 1 cm R cerebellar, 4.4 cm L cerebellar vermis, 4.5 cm R occipital, 2 cm L corpus callosum, 4.4 cm L medial posterior frontal, 2.6 cm R frontal, 1.6 cm L frontal). Symptoms improved on Dex 4q6 and Protonix. Mild R facial droop on 23 exam, with L dysmetria. She was seen by Radiation Oncology as an inpatient on 23, and elected to undergo palliative radiation treatment of her intracranial disease.  The patient received whole brain radiation therapy (23 Gy/5 fx) from 23-23 which was tolerated well.  Today the patient states that she's doing \"okay\", but is quite fatigued secondary to her systemic treatments.  Since her WBRT, the patient endorses better balance, and improvement of her slurred speech.  Continues to have some issues with fine motor control of her hands which has remained stable.  Patient to resume PT for balance and deconditioning.  Endorses that her headaches have improved greatly.  Continues to struggle with PO intake.  She states that she isn't really hungry, but tries to bridge her calories with an occasional Ensure.  She [place] with " [place] [facility] [place]   Denies chills, fever, SOB or chest pain.  Denies seizures, dizziness, diplopia, hearing changes, vision changes or focal sensory/motor changes.  Denies abdominal pain, vomiting or diarrhea.  Does endorses nausea during her systemic treatments.  We discussed the importance of taking her anti emetic medications as a scheduled medication to help, consistently, manage the nausea.  Does endorse some constipation and states that stool softeners don't really help.  We discussed trailing Miralax and she was agreeable.  An MRI of the brain completed today looked relatively stable and told the patient that I'd reach back out to her once the final read is posted.      6/10/24 Interval Visit:   Today the patient is in clinic, accompanied by her sister, for a routine Radiation Oncology FU visit.  Today the patient states that she's doing somewhat better with her mobility/strength s/p PT.  Continues to have some imbalance/generalized weakness when standing which has been stable.  Continues to use a franco when ambulating distances.  Endorses a left occipital headache that radiates to her left neck and shoulder which as been stable.  She rates the pain as a 5/10 and has been managing with Tylenol.   Denies seizures, dizziness, vision changes, or new focal sensory/motor changes.  Continue to have slurred speech which has been stable.  Denies chills, fever, bony pain, SOB, or chest pain.  Denies abdominal pain, pelvic pain, nausea, vomiting or diarrhea.  Has intermittent constipation and manges with a stool softener.  Today we re-reviewed her MRI brain completed on 5/28/24.  Per the Radiology Read, there is again evidence of small satellite lesions along the superior  left cerebellar hemisphere denoted by arrows on the axial post gadolinium volumetric T1 weighted MRI images slice 150 of 256 and 147 of 256 as well as a small linear focus of enhancement along the superior cerebellar vermis denoted by an arrow on  axial post gadolinium volumetric T1 slice 145 of 256 which appear minimally more conspicuous when compared with the prior study dated 02/27/2024. The lesions demonstrate a component of bright signal on the precontrast T1 images which appears slightly more conspicuous when compared with 02/27/2024 raising the possibility of dystrophic calcification/mineralization and/or subacute blood products with a component of suspected minimal superimposed enhancement on the post gadolinium images. There is blooming dark signal on the gradient echo T2 images associated with these lesions suggesting associated blood products/hemosiderin deposition anterior dystrophic calcification/mineralization. The lesions are too small to be further characterized on the obtained advanced MRI imaging. Continued attention to these nonspecific foci on short term follow-up MRI imaging would be recommended.  We discussed the concern regarding the aforementioned satellite lesions and the recommendation of short interval (6-8 weeks) and patient was agreeable to the imaging.     Note: The 5/28/24 MRI brain was discussed with Ravindra Ribeiro MD and he suggested short interval imaging.     7/16/24 Interval Visit:   Today the patient is in clinic, accompanied by her daughter, for a routine Radiation Oncology FU visit and review of an MRI of the brain.  The patient states that she's doing well.   Her generalized weakness had improved a great deal with PT.  She had been walking her driveway to build strength and is now walking the neighborhood.  Her speech is also improved as well.  Denies seizures, dizziness, vision changes, or new focal sensory/motor changes.  Denies chills, fever, bony pain, SOB, or chest pain.  Denies abdominal pain, pelvic pain, nausea, vomiting or diarrhea.  Has intermittent constipation and manges with a stool softener.   We briefly discussed her MRI brain and the difficulty of interpretation give that she's had WBRT.   I suggested  "take her imaging to the 7/17/24 CNS Tumor Board for review.  The patient and daughter were agreeable with the plan.   I told them that I'd call the with recommendations late morning on 7/17/24.     7/17/24 Addendum:    The patients 7/16/24 MRI brain was taken to the 7/17/24 CNS Tumor Board for review.  The consensus was that the imaging is stable as compared to prior imaging.  The recommendation is to repeat an MRI brain with perfusion in 3 months.  This was discussed with the patient and daughter via phone call on 7/17/24. Denies seizures, dizziness, or new focal sensory/motor changes.  She does endorses visual changes.  She states that she sees occasionally sees shadows when looking at objects.  We discussed a referral to Dr. Mccloud (Neuro-ophthalmology) if her vision continues to worsen.  Denies chills, fever, bony pain, SOB, or chest pain.  Denies abdominal pain, pelvic pain, nausea, vomiting or diarrhea.      10/24/24 Interval Visit:   Today the patient is in clinic for a routine Radiation Oncology FU visit and reviw of an MRI brain completed today.  The patient states that she is doing \"okay\".  She continue with PT on a weekly basis for generalized weakness and imbalance.  She states that she continue to have some issue with STM that has been stable.  We discussed going on line and finding some neuro websites such as [employer] to help improve her memory and overall sharpness.  She continues to have some garble speech that his worse when she's tired.      Today the patient had an MRI of the brain with perfusion.  At the time of the visit,  there was no Radiology read posted.  I told the patient that I'd give her a call in the morning with the final results.     4/7/25 Interval Visit:   Today the patient is in clinic for a routine Radiation Oncology FU visit and review of an MRI brain completed today.  Overall, the patient states that her neuro status has been pretty much stable.  She continues to have " generalized weakness and balance issue which requires her to use a tripod cane.  She also continues to have some slowness of speech and occasional word finding that has been stable.  Denies seizures, or new focal sensory/motor deficits.  She does endorse an occasional headache, but nothing out of the ordinary.  She manages with OTC Tylenol.  She also endorses some dizziness when she tilts her head back.  Denies chills, fever, fatigue, SOB or chest pain.  Denies abdominal pain, nausea, vomiting, diarrhea or constipation.      Results of an MRI brain with perfusion, posted today, were not posted at the time of this visit.  I told the patient that I'd reach out to her once a final read had been posted.     8/4/25 Interval FU visit:  Today the patient is in clinic for a routine Radiation Oncology FU visit and review of an MRI brain completed today. Since the patients last visit, she had 3 metastatic cranial lesions identified on a 4/7/25 MR Brain.  She subsequently underwent GK radiation (18-20 Gy each) to 3 lesions (RF x 2 and R cere), ending on 4/29/25.  Today the patient states that she's doing okay.   She continues to have generalized weakness and balance issue which requires her to use a tripod cane.  She also continues to have some slowness of speech and occasional word finding that has been stable.  Patient does endorse a fall on June 11th that left her unconscious.  When she woke, she didn't recognize her sister and had no recollection of the event.  She was subsequently taken to [facility] [place] for management.  Denies seizures, or new focal sensory/motor deficits.  She does endorse an occasional headache and manages with OTC Tylenol.  She also endorses some dizziness when she tilts her head back.  Denies chills, fever, fatigue, SOB or chest pain.  Denies abdominal pain, nausea, vomiting, diarrhea or constipation.      At the time of the patients visit, the results of the MRI brain had not yet been posted.  I told the  patient that I'd reach out to her once there is a final read.     Treatment Rendered:   Radiation Treatments       Active   No active radiation treatments to show.     Completed   A:[id number] (Started on 4/29/2025)   Most recent fraction: 2,000 cGy given on 4/29/2025   Total given: 2,000 cGy / 2,000 cGy  (1 of 1 fractions)   Elapsed Days: 0   Technique: Gamma-Knife        B:[id number] (Started on 4/29/2025)   Most recent fraction: 1,800 cGy given on 4/29/2025   Total given: 1,800 cGy / 1,800 cGy  (1 of 1 fractions)   Elapsed Days: 0   Technique: Gamma-Knife        C: (Started on 4/29/2025)   Most recent fraction: 2,000 cGy given on 4/29/2025   Total given: 2,000 cGy / 2,000 cGy  (1 of 1 fractions)   Elapsed Days: 0   Technique: Gamma-Knife                     Review of Systems:   Review of Systems   Constitutional:  Positive for fatigue. Negative for chills, diaphoresis, fever and unexpected weight change.   HENT:   Negative for hearing loss, lump/mass, tinnitus and trouble swallowing.    Eyes:  Positive for eye problems (age related.).   Respiratory:  Negative for chest tightness, cough, shortness of breath and wheezing.    Cardiovascular:  Negative for chest pain and palpitations.   Gastrointestinal:  Negative for abdominal distention, abdominal pain, constipation, diarrhea, nausea and vomiting.   Genitourinary:  Negative for difficulty urinating and hematuria.    Musculoskeletal:  Positive for back pain (upper right back/shoulder.), gait problem (Left occipital), myalgias and neck pain. Negative for arthralgias and flank pain.   Neurological:  Positive for dizziness, extremity weakness (Generalized weakness.), gait problem (Left occipital), light-headedness (with strenuous activity.) and speech difficulty. Negative for headaches and seizures.   Hematological:  Negative for adenopathy.   Psychiatric/Behavioral:  Positive for depression (Being treated at [facility] [place].) and sleep disturbance. Negative for confusion. The  "patient is not nervous/anxious.      The patient's current pain level was assessed.  They report currently having a pain of 3 out of 10 on top of her arms up to the shoulders.   Patient manages with OTC meds.     Performance Status:   The Karnofsky performance scale today is 70, Cares for self; unable to carry on normal activity or to do active work (ECOG equivalent 1).      OBJECTIVE  Vital Signs:      10/24/2024     4:23 PM 12/24/2024     5:40 PM 4/7/2025     3:20 PM 4/7/2025     4:31 PM 4/21/2025     8:40 AM 4/29/2025     7:31 AM 4/29/2025     2:14 PM   Vitals   Systolic 123   114 113 135 110   Diastolic 85   78 78 76 63   BP Location    Right arm Right arm     Heart Rate 98   104 99 88 87   Temp 36 °C (96.8 °F)   36.5 °C (97.7 °F) 36.1 °C (97 °F) 36.4 °C (97.5 °F)    Resp 18   18 18 16 16   Height     1.637 m (5' 4.45\")  1.626 m (5' 4\")    Weight (lb) 164 171 166 165.8 163.14 162    BMI 25.69 kg/m2 26.78 kg/m2 26 kg/m2 25.97 kg/m2 27.61 kg/m2 27.81 kg/m2    BSA (m2) 1.88 m2 1.92 m2 1.89 m2 1.89 m2 1.83 m2 1.82 m2        Significant value      Physical Exam:  Physical Exam  Constitutional:       General: She is not in acute distress.     Appearance: Normal appearance. She is normal weight. She is not ill-appearing, toxic-appearing or diaphoretic.   HENT:      Head: Normocephalic and atraumatic.      Comments: Thinning hair.      Nose: Nose normal. No congestion or rhinorrhea.      Mouth/Throat:      Mouth: Mucous membranes are moist.     Eyes:      General: Lids are normal. Gaze aligned appropriately.      Extraocular Movements: Extraocular movements intact.      Pupils: Pupils are equal, round, and reactive to light.     Neck:      Thyroid: No thyroid mass or thyroid tenderness.     Cardiovascular:      Rate and Rhythm: Normal rate and regular rhythm.      Pulses: Normal pulses.      Heart sounds: Normal heart sounds. No murmur heard.  Pulmonary:      Effort: Pulmonary effort is normal. No tachypnea or " respiratory distress.      Breath sounds: Normal breath sounds. No wheezing or rhonchi.   Abdominal:      General: Abdomen is flat. Bowel sounds are normal. There is no distension.      Palpations: Abdomen is soft. There is no mass.      Tenderness: There is no abdominal tenderness. There is no guarding or rebound.     Musculoskeletal:         General: No swelling, tenderness, deformity or signs of injury. Normal range of motion.      Cervical back: Full passive range of motion without pain, normal range of motion and neck supple. No rigidity or tenderness. No pain with movement. Normal range of motion.      Right lower leg: No edema.      Left lower leg: No edema.   Lymphadenopathy:      Head:      Right side of head: No submental, submandibular, tonsillar, preauricular, posterior auricular or occipital adenopathy.      Left side of head: No submental, submandibular, tonsillar, preauricular, posterior auricular or occipital adenopathy.      Cervical:      Right cervical: No superficial, deep or posterior cervical adenopathy.     Left cervical: No superficial, deep or posterior cervical adenopathy.     Skin:     General: Skin is warm and dry.      Capillary Refill: Capillary refill takes less than 2 seconds.      Coloration: Skin is not jaundiced or pale.      Findings: No erythema, lesion or rash.     Neurological:      General: No focal deficit present.      Mental Status: She is alert and oriented to person, place, and time.      Cranial Nerves: No cranial nerve deficit.      Sensory: No sensory deficit.      Motor: Weakness present. No seizure activity or pronator drift.      Coordination: Romberg sign positive. Coordination abnormal. Finger-Nose-Finger Test and Heel to Shin Test normal.      Gait: Gait abnormal.      Comments: Slurred speech.    Psychiatric:         Attention and Perception: Attention normal.         Mood and Affect: Mood normal.         Behavior: Behavior normal. Behavior is cooperative.         MR BRAIN W AND WO IV CONTRAST; 2/27/2024 3:27 pm  IMPRESSION:  Status post suboccipital craniectomy with surgical mesh. There is  similar appearance of the resection cavity as well as extra-axial  fluid collections superficial to and surrounding the craniectomy.  Several of the previously noted lesions are stable to less pronounced  in comparison to previous MRI dated 11/27/2023, as described above.  There is a punctate new or more conspicuous lesion centered slightly  to the left of midline in the cerebellar vermis, however. Recommend  continued attention on follow-up.    MR BRAIN TUMOR PERFUSION PROTOCOL W AND WO IV CONTRAST;  5/28/2024  11:05 am   IMPRESSION:  Postoperative changes are again identified compatible with a previous  suboccipital craniectomy with surgical mesh overlying the craniectomy  site.      There is again evidence of a surgical resection cavity deep to the  craniotomy site along the posterior left cerebellar hemisphere. A  small amount of bright signal on the precontrast T1 images again  noted within/along the margins of the surgical resection cavity  suggesting a small amount of dystrophic calcification/mineralization.  The gradient echo T2 images again demonstrate blooming dark signal  surrounding the margins of the surgical resection cavity compatible  with hemosiderin deposition anterior dystrophic  calcification/mineralization. The post gadolinium images again  demonstrate mild superimposed enhancement along the margins of the  surgical resection cavity similar in appearance when compared with  the prior examination dated 02/27/2024. The enhancement along the  margins of the surgical resection cavity is too small to be further  characterized on the obtained advanced MRI imaging. There is again  evidence of surrounding nonenhancing bright signal on the FLAIR and  T2 images within the left cerebellar hemisphere minimally more  pronounced when compared with 02/27/2024 which while  nonspecific  raises the possibility of mild interval increased surrounding edema  and/or gliosis.      There is again evidence of small satellite lesions along the superior  left cerebellar hemisphere denoted by arrows on the axial post  gadolinium volumetric T1 weighted MRI images slice 150 of 256 and 147  of 256 as well as a small linear focus of enhancement along the  superior cerebellar vermis denoted by an arrow on axial post  gadolinium volumetric T1 slice 145 of 256 which appear minimally more  conspicuous when compared with the prior study dated 02/27/2024. The  lesions demonstrate a component of bright signal on the precontrast  T1 images which appears slightly more conspicuous when compared with  02/27/2024 raising the possibility of dystrophic  calcification/mineralization and/or subacute blood products with a  component of suspected minimal superimposed enhancement on the post  gadolinium images. There is blooming dark signal on the gradient echo  T2 images associated with these lesions suggesting associated blood  products/hemosiderin deposition anterior dystrophic  calcification/mineralization. The lesions are too small to be further  characterized on the obtained advanced MRI imaging. Continued  attention to these nonspecific foci on short term follow-up MRI  imaging would be recommended.      There is again evidence of a mixed signal hemorrhagic and/or  partially calcified lesion centered within the left corona radiata  demonstrating mildly more conspicuous rim enhancement on the current  study when compared with 02/27/2024. The rim enhancing lesion  measures approximately 12 mm x 10 mm in transaxial dimensions as seen  on axial post gadolinium volumetric T1 slice 89 of 256 by 17 mm in  craniocaudal dimension as seen on coronal post gadolinium volumetric  T1 slice 94 of 200. The lesion again demonstrates a component of  blooming dark signal on the gradient echo T2 images suggesting  associated blood  products/hemosiderin deposition anterior dystrophic  calcification/mineralization. The blooming dark signal on the  gradient echo T2 images renders further evaluation on the obtained  advanced MRI perfusion data suspect. That being said, no significant  elevated corrected rCBV values are noted in the region of the this  rim enhancing lesion.      There is again evidence of an approximately 7 mm lesion noted along  the right frontal lobe convexity as seen on axial post gadolinium  volumetric T1 slice 63 of 256 similar when compared with the prior  study dated 02/27/2024. There is again evidence of a small 5 mm  lesion noted along the left frontal lobe convexity similar in  appearance when compared with the prior study dated 02/27/2024. There  is again evidence of blooming dark signal on the gradient echo T2  images associated with the lesions suggesting associated blood  products/hemosiderin deposition anterior dystrophic  calcification/mineralization.      There is again evidence of a punctate enhancing lesion along the  lateral right cerebellar hemisphere as seen on axial post gadolinium  T1 slice 173 of 256 which appears similar when compared with the  prior study dated 02/27/2024.      There is a punctate enhancing lesion noted along the lateral right  cerebellar hemisphere as seen on axial post gadolinium volumetric T1  slice 179 of 256 which was not clearly visualized on the prior study.  There is a punctate enhancing lesion noted along the lateral right  frontal lobe as seen on axial post gadolinium volumetric T1 slice 96  of 256 which was not well defined on the on the prior study dated  02/27/2024. Continued attention to these punctate lesions on  follow-up imaging is recommended.      The above findings are superimposed upon moderate brain parenchymal  volume loss.      There is again evidence of scattered as well as more ill-defined  patchy and confluent white matter changes within the  "cerebral  hemispheres bilaterally with the more confluence/ill-defined white  matter changes noted to be more pronounced when compared with the  prior study dated 02/27/2024 and while nonspecific may be post  therapy related with the possibility of sequelae of more remote  small-vessel ischemic changes not excluded.    MR BRAIN TUMOR PERFUSION PROTOCOL W AND WO IV CONTRAST;  7/16/2024  9:47 am  IMPRESSION:  Stable MR appearance of the cerebral and cerebellar lesions and  postoperative/posttreatment changes. Perfusion/permeability imaging  is not useful in this case secondary to insufficient measurable  enhancing lesions and adjacent susceptibility artifact.       OBJECTIVE:   Vital Signs:        10/24/2024     4:23 PM 12/24/2024     5:40 PM 4/7/2025     3:20 PM 4/7/2025     4:31 PM 4/21/2025     8:40 AM 4/29/2025     7:31 AM 4/29/2025     2:14 PM   Vitals   Systolic 123   114 113 135 110   Diastolic 85   78 78 76 63   BP Location    Right arm Right arm     Heart Rate 98   104 99 88 87   Temp 36 °C (96.8 °F)   36.5 °C (97.7 °F) 36.1 °C (97 °F) 36.4 °C (97.5 °F)    Resp 18   18 18 16 16   Height     1.637 m (5' 4.45\")  1.626 m (5' 4\")    Weight (lb) 164 171 166 165.8 163.14 162    BMI 25.69 kg/m2 26.78 kg/m2 26 kg/m2 25.97 kg/m2 27.61 kg/m2 27.81 kg/m2    BSA (m2) 1.88 m2 1.92 m2 1.89 m2 1.89 m2 1.83 m2 1.82 m2        Significant value           Assessment / Plan:   59 yoF c metastatic ovarian/fallopian tube cancer s/p debulking in 2020. On maintenance niraparib. Extracranially controlled on 8/11/23 CT CAP. Developed imbalance, slurred speech, R facial droop over 3 wks. 8/11/23 brain MRI at [facility] = Multiple metastases (3.6 cm L cerebellar, 1 cm R cerebellar, 4.4 cm L cerebellar vermis, 4.5 cm R occipital, 2 cm L corpus callosum, 4.4 cm L medial posterior frontal, 2.6 cm R frontal, 1.6 cm L frontal). Symptoms improved on Dex 4q6 and Protonix. Mild R facial droop on 8/14/23 exam, with L dysmetria. She was " seen by Radiation Oncology as an inpatient on 8/14/23, and elected to undergo palliative radiation treatment of her intracranial disease.  The patient received whole brain radiation therapy (23 Gy/5 fx) from 8/22/23-8/28/23 which was tolerated well.  Since the patients last visit, she had 3 metastatic cranial lesions identified on a 4/7/25 MR Brain.  She subsequently underwent GK radiation (18-20 Gy each) to 3 lesions (RF x 2 and R cere), ending on 4/29/25.     PLAN:      --MRI brain on 11/3/25.   --FU with Karson Moreno CNP on 11/3/25.   --Continue to follow with Dr. Reese for Medical Oncology [email or web address]     Please reach out with any questions or concerns.     NCCN Guidelines were applicable to guide this patients treatment plan.  KEILA Negro-CNP

## 2025-08-04 ENCOUNTER — HOSPITAL ENCOUNTER (OUTPATIENT)
Dept: RADIOLOGY | Facility: HOSPITAL | Age: 60
Discharge: HOME | End: 2025-08-04
Payer: COMMERCIAL

## 2025-08-04 ENCOUNTER — HOSPITAL ENCOUNTER (OUTPATIENT)
Dept: RADIATION ONCOLOGY | Facility: HOSPITAL | Age: 60
Setting detail: RADIATION/ONCOLOGY SERIES
Discharge: HOME | End: 2025-08-04
Payer: COMMERCIAL

## 2025-08-04 VITALS
WEIGHT: 154.2 LBS | OXYGEN SATURATION: 97 % | SYSTOLIC BLOOD PRESSURE: 117 MMHG | TEMPERATURE: 97 F | RESPIRATION RATE: 20 BRPM | DIASTOLIC BLOOD PRESSURE: 81 MMHG | HEART RATE: 100 BPM | BODY MASS INDEX: 26.47 KG/M2

## 2025-08-04 DIAGNOSIS — C79.31 MALIGNANT NEOPLASM METASTATIC TO BRAIN (MULTI): Primary | ICD-10-CM

## 2025-08-04 DIAGNOSIS — C34.90 PRIMARY MALIGNANT NEOPLASM OF LUNG WITH METASTASIS TO BRAIN (MULTI): ICD-10-CM

## 2025-08-04 DIAGNOSIS — C79.31 PRIMARY MALIGNANT NEOPLASM OF LUNG WITH METASTASIS TO BRAIN (MULTI): ICD-10-CM

## 2025-08-04 DIAGNOSIS — G93.89 BRAIN MASS: ICD-10-CM

## 2025-08-04 DIAGNOSIS — C48.2: ICD-10-CM

## 2025-08-04 PROCEDURE — 70553 MRI BRAIN STEM W/O & W/DYE: CPT | Performed by: RADIOLOGY

## 2025-08-04 PROCEDURE — 99214 OFFICE O/P EST MOD 30 MIN: CPT

## 2025-08-04 PROCEDURE — 70553 MRI BRAIN STEM W/O & W/DYE: CPT

## 2025-08-04 PROCEDURE — A9575 INJ GADOTERATE MEGLUMI 0.1ML: HCPCS | Mod: SE | Performed by: STUDENT IN AN ORGANIZED HEALTH CARE EDUCATION/TRAINING PROGRAM

## 2025-08-04 PROCEDURE — 2500000004 HC RX 250 GENERAL PHARMACY W/ HCPCS (ALT 636 FOR OP/ED): Mod: SE | Performed by: STUDENT IN AN ORGANIZED HEALTH CARE EDUCATION/TRAINING PROGRAM

## 2025-08-04 PROCEDURE — 2550000001 HC RX 255 CONTRASTS: Mod: SE | Performed by: STUDENT IN AN ORGANIZED HEALTH CARE EDUCATION/TRAINING PROGRAM

## 2025-08-04 RX ORDER — GADOTERATE MEGLUMINE 376.9 MG/ML
15 INJECTION INTRAVENOUS
Status: COMPLETED | OUTPATIENT
Start: 2025-08-04 | End: 2025-08-04

## 2025-08-04 RX ORDER — HEPARIN 100 UNIT/ML
5 SYRINGE INTRAVENOUS AS NEEDED
Status: COMPLETED | OUTPATIENT
Start: 2025-08-04 | End: 2025-08-04

## 2025-08-04 RX ADMIN — HEPARIN 500 UNITS: 100 SYRINGE at 17:24

## 2025-08-04 RX ADMIN — GADOTERATE MEGLUMINE 15 ML: 376.9 INJECTION INTRAVENOUS at 17:57

## 2025-08-04 ASSESSMENT — PAIN SCALES - GENERAL: PAINLEVEL_OUTOF10: 9
